# Patient Record
Sex: MALE | Race: WHITE | NOT HISPANIC OR LATINO | Employment: FULL TIME | URBAN - METROPOLITAN AREA
[De-identification: names, ages, dates, MRNs, and addresses within clinical notes are randomized per-mention and may not be internally consistent; named-entity substitution may affect disease eponyms.]

---

## 2017-01-23 DIAGNOSIS — Z20.6 HIV EXPOSURE: ICD-10-CM

## 2017-01-23 RX ORDER — EMTRICITABINE AND TENOFOVIR DISOPROXIL FUMARATE 200; 300 MG/1; MG/1
1 TABLET, FILM COATED ORAL DAILY
Qty: 30 TABLET | Refills: 3 | Status: SHIPPED | OUTPATIENT
Start: 2017-01-23 | End: 2017-05-29 | Stop reason: SDUPTHER

## 2017-05-25 DIAGNOSIS — Z20.6 HIV EXPOSURE: ICD-10-CM

## 2017-05-25 RX ORDER — EMTRICITABINE AND TENOFOVIR DISOPROXIL FUMARATE 200; 300 MG/1; MG/1
1 TABLET, FILM COATED ORAL DAILY
Qty: 30 TABLET | Refills: 3 | Status: CANCELLED | OUTPATIENT
Start: 2017-05-25 | End: 2017-06-24

## 2017-05-25 NOTE — TELEPHONE ENCOUNTER
Received refill request from this patient.  He needs labs and an appointment before I am able to refill his truvada.  Schedule HIV test and CMP.  He should have these checked every 3 months while taking truvada for prep.

## 2017-05-29 RX ORDER — EMTRICITABINE AND TENOFOVIR DISOPROXIL FUMARATE 200; 300 MG/1; MG/1
1 TABLET, FILM COATED ORAL DAILY
Qty: 30 TABLET | Refills: 3 | Status: SHIPPED | OUTPATIENT
Start: 2017-05-29 | End: 2017-11-09 | Stop reason: SDUPTHER

## 2017-06-05 ENCOUNTER — LAB VISIT (OUTPATIENT)
Dept: LAB | Facility: HOSPITAL | Age: 37
End: 2017-06-05
Attending: INTERNAL MEDICINE
Payer: COMMERCIAL

## 2017-06-05 DIAGNOSIS — Z20.6 HIV EXPOSURE: ICD-10-CM

## 2017-06-05 LAB
ALBUMIN SERPL BCP-MCNC: 4.1 G/DL
ALP SERPL-CCNC: 79 U/L
ALT SERPL W/O P-5'-P-CCNC: 37 U/L
ANION GAP SERPL CALC-SCNC: 11 MMOL/L
AST SERPL-CCNC: 37 U/L
BILIRUB SERPL-MCNC: 0.6 MG/DL
BUN SERPL-MCNC: 16 MG/DL
CALCIUM SERPL-MCNC: 9.7 MG/DL
CHLORIDE SERPL-SCNC: 103 MMOL/L
CO2 SERPL-SCNC: 27 MMOL/L
CREAT SERPL-MCNC: 1.3 MG/DL
EST. GFR  (AFRICAN AMERICAN): >60 ML/MIN/1.73 M^2
EST. GFR  (NON AFRICAN AMERICAN): >60 ML/MIN/1.73 M^2
GLUCOSE SERPL-MCNC: 105 MG/DL
POTASSIUM SERPL-SCNC: 4 MMOL/L
PROT SERPL-MCNC: 7.5 G/DL
SODIUM SERPL-SCNC: 141 MMOL/L

## 2017-06-05 PROCEDURE — 80053 COMPREHEN METABOLIC PANEL: CPT

## 2017-06-05 PROCEDURE — 86703 HIV-1/HIV-2 1 RESULT ANTBDY: CPT

## 2017-06-05 PROCEDURE — 36415 COLL VENOUS BLD VENIPUNCTURE: CPT

## 2017-06-06 LAB — HIV 1+2 AB+HIV1 P24 AG SERPL QL IA: NEGATIVE

## 2017-06-22 ENCOUNTER — OFFICE VISIT (OUTPATIENT)
Dept: INFECTIOUS DISEASES | Facility: CLINIC | Age: 37
End: 2017-06-22
Payer: COMMERCIAL

## 2017-06-22 VITALS
TEMPERATURE: 98 F | HEART RATE: 79 BPM | SYSTOLIC BLOOD PRESSURE: 125 MMHG | WEIGHT: 186 LBS | DIASTOLIC BLOOD PRESSURE: 73 MMHG

## 2017-06-22 DIAGNOSIS — Z20.6 HIV EXPOSURE: Primary | ICD-10-CM

## 2017-06-22 DIAGNOSIS — J06.9 VIRAL UPPER RESPIRATORY TRACT INFECTION: ICD-10-CM

## 2017-06-22 PROCEDURE — 99213 OFFICE O/P EST LOW 20 MIN: CPT | Mod: 25,S$GLB,, | Performed by: INTERNAL MEDICINE

## 2017-06-22 PROCEDURE — 90471 IMMUNIZATION ADMIN: CPT | Mod: S$GLB,,, | Performed by: INTERNAL MEDICINE

## 2017-06-22 PROCEDURE — 99999 PR PBB SHADOW E&M-EST. PATIENT-LVL III: CPT | Mod: PBBFAC,,, | Performed by: INTERNAL MEDICINE

## 2017-06-22 PROCEDURE — 90746 HEPB VACCINE 3 DOSE ADULT IM: CPT | Mod: S$GLB,,, | Performed by: INTERNAL MEDICINE

## 2017-06-22 PROCEDURE — 90651 9VHPV VACCINE 2/3 DOSE IM: CPT | Mod: S$GLB,,, | Performed by: INTERNAL MEDICINE

## 2017-06-22 PROCEDURE — 90472 IMMUNIZATION ADMIN EACH ADD: CPT | Mod: S$GLB,,, | Performed by: INTERNAL MEDICINE

## 2017-06-22 RX ORDER — CEFDINIR 300 MG/1
CAPSULE ORAL
COMMUNITY
Start: 2017-06-19 | End: 2017-12-15

## 2017-06-22 RX ORDER — CODEINE PHOSPHATE AND GUAIFENESIN 10; 100 MG/5ML; MG/5ML
SOLUTION ORAL
COMMUNITY
Start: 2017-06-19 | End: 2017-12-20

## 2017-06-22 RX ORDER — BENZONATATE 100 MG/1
100 CAPSULE ORAL 3 TIMES DAILY PRN
Qty: 30 CAPSULE | Refills: 1 | Status: SHIPPED | OUTPATIENT
Start: 2017-06-22 | End: 2017-12-20

## 2017-06-22 NOTE — PROGRESS NOTES
Subjective:      Patient ID: Jose Santiago is a 36 y.o. male.    Chief Complaint:Follow-up      History of Present Illness    35 y/o with no significant past medical history.  His an HIV negative male who is currently on PREP.  He is involved with an HIV+ partner who is stable on antiretrovirals.  He admits that they don't use condoms.  They recently got engaged and had an engagement party.  About 1-2 days after the engagement party (3 days ago), he developed cough, sinus congestion.  He had fevers and chills initially but this has quickly resolved.  Was seen in an urgent care facility.  Tests for influenza and strep were negative.  He was given cefdinir (omnicef) and a guaifenesin-codeine.  He is still coughing and is still congested.    He quit smoking about 2 months ago.  He got engaged recently and at his engagement party, he smoked again.  He became sick shortly afterwards.     Social History  He has been with his partner (ariel) for about 4 years.  His partner is HIV+    Review of Systems   Constitution: Positive for fever, weakness and malaise/fatigue. Negative for chills, decreased appetite, night sweats, weight gain and weight loss.   HENT: Negative for congestion, ear pain, headaches, hearing loss, hoarse voice, sore throat and tinnitus.    Eyes: Negative for blurred vision, redness and visual disturbance.   Cardiovascular: Negative for chest pain, leg swelling and palpitations.   Respiratory: Positive for cough. Negative for hemoptysis, shortness of breath and sputum production.    Hematologic/Lymphatic: Negative for adenopathy. Does not bruise/bleed easily.   Skin: Negative for dry skin, itching, rash and suspicious lesions.   Musculoskeletal: Negative for back pain, joint pain, myalgias and neck pain.   Gastrointestinal: Negative for abdominal pain, constipation, diarrhea, heartburn, nausea and vomiting.   Genitourinary: Negative for dysuria, flank pain, frequency, hematuria, hesitancy and urgency.    Neurological: Negative for dizziness, numbness and paresthesias.   Psychiatric/Behavioral: Negative for depression and memory loss. The patient does not have insomnia and is not nervous/anxious.      Objective:   Physical Exam   Constitutional: He is oriented to person, place, and time. He appears well-developed and well-nourished. No distress.   HENT:   Head: Normocephalic and atraumatic.   Right Ear: External ear normal.   Left Ear: External ear normal.   Nose: Nose normal.   Mouth/Throat: Oropharynx is clear and moist. No oropharyngeal exudate.   Eyes: Conjunctivae and EOM are normal. Pupils are equal, round, and reactive to light. Right eye exhibits no discharge. Left eye exhibits no discharge. No scleral icterus.   Neck: Normal range of motion. Neck supple. No JVD present. No tracheal deviation present. No thyromegaly present.   Cardiovascular: Normal rate, regular rhythm and intact distal pulses.  Exam reveals no gallop and no friction rub.    No murmur heard.  Pulmonary/Chest: Effort normal and breath sounds normal. No stridor. No respiratory distress. He has no wheezes. He has no rales. He exhibits no tenderness.   Abdominal: Soft. Bowel sounds are normal. He exhibits no distension and no mass. There is no tenderness. There is no rebound and no guarding.   Musculoskeletal: Normal range of motion. He exhibits no edema or tenderness.   Lymphadenopathy:     He has no cervical adenopathy.   Neurological: He is alert and oriented to person, place, and time. He has normal reflexes. He displays normal reflexes. No cranial nerve deficit. He exhibits normal muscle tone. Coordination normal.   Skin: Skin is warm. No rash noted. He is not diaphoretic. No erythema. No pallor.   Psychiatric: He has a normal mood and affect. His behavior is normal. Judgment and thought content normal.   Nursing note and vitals reviewed.    Assessment:       1. HIV exposure :  He is to continue truvada as part of PREP.  He will receive  his final HPV and hep b vaccination today.  I will check hepatitis b surface antibody in 3 months with his regular labs.   2. Viral upper respiratory tract infection :  Asked him to stop the antibiotics prescribed by urgent care.  Will treat symptomatically.           Plan:       HIV exposure  -     HIV-1 and HIV-2 antibodies; Future; Expected date: 06/22/2017  -     Comprehensive metabolic panel; Future; Expected date: 06/22/2017  -     CBC auto differential; Future; Expected date: 06/22/2017  -     RPR; Future; Expected date: 06/22/2017  -     C. trachomatis/N. gonorrhoeae by AMP DNA Urine; Future; Expected date: 06/22/2017  -     Urinalysis; Future; Expected date: 06/22/2017  -     HPV Vaccine (9-Valent) (3 Dose) (IM); Future; Expected date: 06/22/2017  -     Hepatitis B surface antibody; Future; Expected date: 06/22/2017  -     Hepatitis B Vaccine (Adult) (IM); Standing    Viral upper respiratory tract infection  -     benzonatate (TESSALON PERLES) 100 MG capsule; Take 1 capsule (100 mg total) by mouth 3 (three) times daily as needed for Cough.  Dispense: 30 capsule; Refill: 1

## 2017-08-01 ENCOUNTER — CLINICAL SUPPORT (OUTPATIENT)
Dept: INFECTIOUS DISEASES | Facility: CLINIC | Age: 37
End: 2017-08-01
Payer: COMMERCIAL

## 2017-08-01 DIAGNOSIS — Z20.6 HIV EXPOSURE: ICD-10-CM

## 2017-08-01 PROCEDURE — 90746 HEPB VACCINE 3 DOSE ADULT IM: CPT | Mod: S$GLB,,, | Performed by: INTERNAL MEDICINE

## 2017-08-01 PROCEDURE — 90471 IMMUNIZATION ADMIN: CPT | Mod: S$GLB,,, | Performed by: INTERNAL MEDICINE

## 2017-08-01 NOTE — PROGRESS NOTES
Hepatitis b vaccine administered.  Pt tolerated well and left in NAD.  Next appt fo vaccine # 3 to complete the series given.

## 2017-11-09 ENCOUNTER — TELEPHONE (OUTPATIENT)
Dept: INFECTIOUS DISEASES | Facility: CLINIC | Age: 37
End: 2017-11-09

## 2017-11-09 DIAGNOSIS — Z20.6 HIV EXPOSURE: Primary | ICD-10-CM

## 2017-11-09 DIAGNOSIS — Z20.6 HIV EXPOSURE: ICD-10-CM

## 2017-11-09 RX ORDER — EMTRICITABINE AND TENOFOVIR DISOPROXIL FUMARATE 200; 300 MG/1; MG/1
1 TABLET, FILM COATED ORAL DAILY
Qty: 30 TABLET | Refills: 0 | Status: SHIPPED | OUTPATIENT
Start: 2017-11-09 | End: 2017-11-20 | Stop reason: SDUPTHER

## 2017-11-09 RX ORDER — EMTRICITABINE AND TENOFOVIR DISOPROXIL FUMARATE 200; 300 MG/1; MG/1
1 TABLET, FILM COATED ORAL DAILY
Qty: 30 TABLET | Refills: 3 | Status: CANCELLED | OUTPATIENT
Start: 2017-11-09 | End: 2018-03-09

## 2017-11-09 NOTE — TELEPHONE ENCOUNTER
He needs to have labs and a follow up visit 1 week after.  Will refill his medications for 4 weeks for now.

## 2017-11-09 NOTE — TELEPHONE ENCOUNTER
Does this patient need to come in for labs and a follow-up visit? He was last seen in June. Please advise. XIMENA

## 2017-11-13 NOTE — TELEPHONE ENCOUNTER
Attempted to contact patient twice to schedule labs and follow-up visit, but I have not been successful in reaching him. XIMENA

## 2017-11-14 ENCOUNTER — PATIENT MESSAGE (OUTPATIENT)
Dept: INFECTIOUS DISEASES | Facility: CLINIC | Age: 37
End: 2017-11-14

## 2017-11-20 ENCOUNTER — PATIENT MESSAGE (OUTPATIENT)
Dept: INFECTIOUS DISEASES | Facility: CLINIC | Age: 37
End: 2017-11-20

## 2017-11-20 DIAGNOSIS — Z20.6 HIV EXPOSURE: ICD-10-CM

## 2017-11-21 ENCOUNTER — LAB VISIT (OUTPATIENT)
Dept: LAB | Facility: HOSPITAL | Age: 37
End: 2017-11-21
Attending: INTERNAL MEDICINE
Payer: COMMERCIAL

## 2017-11-21 DIAGNOSIS — Z20.6 HIV EXPOSURE: ICD-10-CM

## 2017-11-21 LAB
ALBUMIN SERPL BCP-MCNC: 4.3 G/DL
ALP SERPL-CCNC: 73 U/L
ALT SERPL W/O P-5'-P-CCNC: 24 U/L
ANION GAP SERPL CALC-SCNC: 8 MMOL/L
AST SERPL-CCNC: 25 U/L
BASOPHILS # BLD AUTO: 0.03 K/UL
BASOPHILS NFR BLD: 0.5 %
BILIRUB SERPL-MCNC: 0.5 MG/DL
BUN SERPL-MCNC: 16 MG/DL
CALCIUM SERPL-MCNC: 10.1 MG/DL
CHLORIDE SERPL-SCNC: 105 MMOL/L
CO2 SERPL-SCNC: 27 MMOL/L
CREAT SERPL-MCNC: 1.2 MG/DL
DIFFERENTIAL METHOD: ABNORMAL
EOSINOPHIL # BLD AUTO: 0 K/UL
EOSINOPHIL NFR BLD: 0.5 %
ERYTHROCYTE [DISTWIDTH] IN BLOOD BY AUTOMATED COUNT: 13 %
EST. GFR  (AFRICAN AMERICAN): >60 ML/MIN/1.73 M^2
EST. GFR  (NON AFRICAN AMERICAN): >60 ML/MIN/1.73 M^2
GLUCOSE SERPL-MCNC: 97 MG/DL
HCT VFR BLD AUTO: 42.1 %
HGB BLD-MCNC: 14.6 G/DL
HIV 1+2 AB+HIV1 P24 AG SERPL QL IA: NEGATIVE
IMM GRANULOCYTES # BLD AUTO: 0.01 K/UL
IMM GRANULOCYTES NFR BLD AUTO: 0.2 %
LYMPHOCYTES # BLD AUTO: 1.2 K/UL
LYMPHOCYTES NFR BLD: 19 %
MCH RBC QN AUTO: 31.2 PG
MCHC RBC AUTO-ENTMCNC: 34.7 G/DL
MCV RBC AUTO: 90 FL
MONOCYTES # BLD AUTO: 0.5 K/UL
MONOCYTES NFR BLD: 7.1 %
NEUTROPHILS # BLD AUTO: 4.6 K/UL
NEUTROPHILS NFR BLD: 72.7 %
NRBC BLD-RTO: 0 /100 WBC
PLATELET # BLD AUTO: 267 K/UL
PMV BLD AUTO: 9.4 FL
POTASSIUM SERPL-SCNC: 4.8 MMOL/L
PROT SERPL-MCNC: 8 G/DL
RBC # BLD AUTO: 4.68 M/UL
SODIUM SERPL-SCNC: 140 MMOL/L
WBC # BLD AUTO: 6.36 K/UL

## 2017-11-21 PROCEDURE — 86703 HIV-1/HIV-2 1 RESULT ANTBDY: CPT

## 2017-11-21 PROCEDURE — 80053 COMPREHEN METABOLIC PANEL: CPT

## 2017-11-21 PROCEDURE — 86592 SYPHILIS TEST NON-TREP QUAL: CPT

## 2017-11-21 PROCEDURE — 85025 COMPLETE CBC W/AUTO DIFF WBC: CPT

## 2017-11-21 PROCEDURE — 36415 COLL VENOUS BLD VENIPUNCTURE: CPT

## 2017-11-21 PROCEDURE — 86780 TREPONEMA PALLIDUM: CPT

## 2017-11-21 RX ORDER — EMTRICITABINE AND TENOFOVIR DISOPROXIL FUMARATE 200; 300 MG/1; MG/1
1 TABLET, FILM COATED ORAL DAILY
Qty: 30 TABLET | Refills: 0 | Status: SHIPPED | OUTPATIENT
Start: 2017-11-21 | End: 2017-12-15 | Stop reason: SDUPTHER

## 2017-11-22 LAB — RPR SER QL: NORMAL

## 2017-11-29 LAB — T PALLIDUM AB SER QL IF: REACTIVE

## 2017-12-15 ENCOUNTER — OFFICE VISIT (OUTPATIENT)
Dept: INFECTIOUS DISEASES | Facility: CLINIC | Age: 37
End: 2017-12-15
Payer: COMMERCIAL

## 2017-12-15 ENCOUNTER — CLINICAL SUPPORT (OUTPATIENT)
Dept: INFECTIOUS DISEASES | Facility: CLINIC | Age: 37
End: 2017-12-15
Payer: COMMERCIAL

## 2017-12-15 VITALS
SYSTOLIC BLOOD PRESSURE: 118 MMHG | TEMPERATURE: 99 F | BODY MASS INDEX: 31.28 KG/M2 | WEIGHT: 199.31 LBS | HEART RATE: 89 BPM | DIASTOLIC BLOOD PRESSURE: 72 MMHG | HEIGHT: 67 IN

## 2017-12-15 DIAGNOSIS — Z20.6 HIV EXPOSURE: ICD-10-CM

## 2017-12-15 PROCEDURE — 99213 OFFICE O/P EST LOW 20 MIN: CPT | Mod: S$GLB,,, | Performed by: INTERNAL MEDICINE

## 2017-12-15 PROCEDURE — 90471 IMMUNIZATION ADMIN: CPT | Mod: S$GLB,,, | Performed by: INTERNAL MEDICINE

## 2017-12-15 PROCEDURE — 99999 PR PBB SHADOW E&M-EST. PATIENT-LVL II: CPT | Mod: PBBFAC,,,

## 2017-12-15 PROCEDURE — 99999 PR PBB SHADOW E&M-EST. PATIENT-LVL III: CPT | Mod: PBBFAC,,, | Performed by: INTERNAL MEDICINE

## 2017-12-15 PROCEDURE — 90746 HEPB VACCINE 3 DOSE ADULT IM: CPT | Mod: S$GLB,,, | Performed by: INTERNAL MEDICINE

## 2017-12-15 RX ORDER — EMTRICITABINE AND TENOFOVIR DISOPROXIL FUMARATE 200; 300 MG/1; MG/1
1 TABLET, FILM COATED ORAL DAILY
Qty: 30 TABLET | Refills: 3 | Status: SHIPPED | OUTPATIENT
Start: 2017-12-15 | End: 2018-03-22 | Stop reason: SDUPTHER

## 2017-12-15 RX ORDER — AMOXICILLIN 875 MG/1
TABLET, FILM COATED ORAL
COMMUNITY
Start: 2017-11-26 | End: 2017-12-20

## 2017-12-15 NOTE — PROGRESS NOTES
Subjective:      Patient ID: Jose Santiago is a 36 y.o. male.    Chief Complaint:Follow-up    History of Present Illness       38 y/o with no significant past medical history.  His an HIV negative male who is currently on PREP.  He is involved with an HIV+ partner who is stable on antiretrovirals.  He admits that they don't use condoms.  they are now engaged and will be getting  late 2018.       Social History  He has been with his partner (ariel) for about 4 years.  His partner is HIV+ and takes HAART.    Review of Systems   Constitution: Negative for chills, decreased appetite, fever, weakness, malaise/fatigue, night sweats, weight gain and weight loss.   HENT: Negative for congestion, ear pain, hearing loss, hoarse voice, sore throat and tinnitus.    Eyes: Negative for blurred vision, redness and visual disturbance.   Cardiovascular: Negative for chest pain, leg swelling and palpitations.   Respiratory: Negative for cough, hemoptysis, shortness of breath and sputum production.    Hematologic/Lymphatic: Negative for adenopathy. Does not bruise/bleed easily.   Skin: Negative for dry skin, itching, rash and suspicious lesions.   Musculoskeletal: Negative for back pain, joint pain, myalgias and neck pain.   Gastrointestinal: Negative for abdominal pain, constipation, diarrhea, heartburn, nausea and vomiting.   Genitourinary: Negative for dysuria, flank pain, frequency, hematuria, hesitancy and urgency.   Neurological: Negative for dizziness, headaches, numbness and paresthesias.   Psychiatric/Behavioral: Negative for depression and memory loss. The patient does not have insomnia and is not nervous/anxious.      Objective:   Physical Exam   Constitutional: He is oriented to person, place, and time. He appears well-developed and well-nourished. No distress.   HENT:   Head: Normocephalic and atraumatic.   Right Ear: External ear normal.   Left Ear: External ear normal.   Nose: Nose normal.   Mouth/Throat:  Oropharynx is clear and moist. No oropharyngeal exudate.   Eyes: Conjunctivae and EOM are normal. Pupils are equal, round, and reactive to light. Right eye exhibits no discharge. Left eye exhibits no discharge. No scleral icterus.   Neck: Normal range of motion. Neck supple. No JVD present. No tracheal deviation present. No thyromegaly present.   Cardiovascular: Normal rate, regular rhythm, normal heart sounds and intact distal pulses.  Exam reveals no gallop and no friction rub.    No murmur heard.  Pulmonary/Chest: Effort normal and breath sounds normal. No stridor. No respiratory distress. He has no wheezes. He has no rales. He exhibits no tenderness.   Abdominal: Soft. Bowel sounds are normal. He exhibits no distension and no mass. There is no tenderness. There is no rebound and no guarding.   Musculoskeletal: Normal range of motion. He exhibits no edema or tenderness.   Lymphadenopathy:     He has no cervical adenopathy.   Neurological: He is alert and oriented to person, place, and time. He has normal reflexes. He displays normal reflexes. No cranial nerve deficit. He exhibits normal muscle tone. Coordination normal.   Skin: Skin is warm. No rash noted. He is not diaphoretic. No erythema. No pallor.   Psychiatric: He has a normal mood and affect. His behavior is normal. Judgment and thought content normal.   Nursing note and vitals reviewed.    Assessment:       1. HIV exposure        Labs reviewed.  HIV testing is negative.  Will continue truvada for PREP.  He will like to have a primary care physician.   Will try to arrange a primary care appointment.  He may continue PREP under the care of his PCP.  Plan:       HIV exposure  -     emtricitabine-tenofovir 200-300 mg (TRUVADA) 200-300 mg Tab; Take 1 tablet by mouth once daily.  Dispense: 30 tablet; Refill: 3

## 2017-12-20 ENCOUNTER — OFFICE VISIT (OUTPATIENT)
Dept: INTERNAL MEDICINE | Facility: CLINIC | Age: 37
End: 2017-12-20
Payer: COMMERCIAL

## 2017-12-20 VITALS
TEMPERATURE: 99 F | OXYGEN SATURATION: 98 % | BODY MASS INDEX: 30.93 KG/M2 | WEIGHT: 197.06 LBS | HEIGHT: 67 IN | SYSTOLIC BLOOD PRESSURE: 112 MMHG | HEART RATE: 83 BPM | DIASTOLIC BLOOD PRESSURE: 78 MMHG

## 2017-12-20 DIAGNOSIS — J32.9 RECURRENT SINUSITIS: ICD-10-CM

## 2017-12-20 DIAGNOSIS — R46.81 OBSESSIVE-COMPULSIVE BEHAVIOR: ICD-10-CM

## 2017-12-20 DIAGNOSIS — J01.91 ACUTE RECURRENT SINUSITIS, UNSPECIFIED LOCATION: Primary | ICD-10-CM

## 2017-12-20 DIAGNOSIS — Z51.81 MEDICATION MONITORING ENCOUNTER: ICD-10-CM

## 2017-12-20 DIAGNOSIS — Z13.220 ENCOUNTER FOR LIPID SCREENING FOR CARDIOVASCULAR DISEASE: ICD-10-CM

## 2017-12-20 DIAGNOSIS — Z13.6 ENCOUNTER FOR LIPID SCREENING FOR CARDIOVASCULAR DISEASE: ICD-10-CM

## 2017-12-20 DIAGNOSIS — F41.9 ANXIETY: ICD-10-CM

## 2017-12-20 DIAGNOSIS — R05.8 POST-VIRAL COUGH SYNDROME: ICD-10-CM

## 2017-12-20 PROBLEM — Z72.0 TOBACCO USE: Status: ACTIVE | Noted: 2017-12-20

## 2017-12-20 PROBLEM — Z20.6 CONTACT WITH AND (SUSPECTED) EXPOSURE TO HUMAN IMMUNODEFICIENCY VIRUS (HIV): Status: ACTIVE | Noted: 2017-12-20

## 2017-12-20 PROCEDURE — 94640 AIRWAY INHALATION TREATMENT: CPT | Mod: S$GLB,,, | Performed by: INTERNAL MEDICINE

## 2017-12-20 PROCEDURE — 99201 PR OFFICE/OUTPT VISIT,NEW,LEVL I: CPT | Mod: 25,S$GLB,, | Performed by: INTERNAL MEDICINE

## 2017-12-20 PROCEDURE — 99999 PR PBB SHADOW E&M-EST. PATIENT-LVL III: CPT | Mod: PBBFAC,,, | Performed by: INTERNAL MEDICINE

## 2017-12-20 RX ORDER — FLUTICASONE PROPIONATE 50 MCG
2 SPRAY, SUSPENSION (ML) NASAL 2 TIMES DAILY
Qty: 16 G | Refills: 12 | COMMUNITY
Start: 2017-12-20 | End: 2018-01-11

## 2017-12-20 RX ORDER — AMOXICILLIN AND CLAVULANATE POTASSIUM 875; 125 MG/1; MG/1
1 TABLET, FILM COATED ORAL 2 TIMES DAILY
Qty: 14 TABLET | Refills: 0 | Status: SHIPPED | OUTPATIENT
Start: 2017-12-20 | End: 2017-12-27

## 2017-12-20 RX ORDER — ALBUTEROL SULFATE 1.25 MG/3ML
1.25 SOLUTION RESPIRATORY (INHALATION)
Status: COMPLETED | OUTPATIENT
Start: 2017-12-20 | End: 2017-12-20

## 2017-12-20 RX ADMIN — ALBUTEROL SULFATE 1.25 MG: 1.25 SOLUTION RESPIRATORY (INHALATION) at 05:12

## 2017-12-20 NOTE — PROGRESS NOTES
Portions of this note are generated with voice recognition software. Typographical errors may exist.     SUBJECTIVE:    This is a/an 37 y.o. male here for primary care visit for  Chief Complaint   Patient presents with    Sinusitis    Otalgia    Cough     Symptoms today started about Sunday.  Started with rhinitis and postnasal drip and some sinus pressure with coughing.  Patient stating that he is very frustrated to have symptoms like this again.  He had similar symptoms at the end of November and was treated perhaps inappropriately with anti-biotic amoxicillin and a steroid injection.  Patient states that he suffers chronically with rhinosinusitis and for this reason he uses Flonase once daily almost every day.  Today he states that he doesn't have sinus pressure to a significant degree.  Nasal discharge is not purulent.  Denies uncharacteristic dyspnea on exertion or wheezing.  No constitutional symptoms    Obsessive compulsive behavior. states that he has been in therapy for about a year and a half to help with anxiety and obsessive and compulsive behavior.  States that recently symptoms have been getting worse and his counselor has indicated that he should pursue pharmacotherapy in addition to supportive psychotherapy.          Medications Reviewed and Updated    Past medical, family, and social histories were reviewed and updated.    Review of Systems negative unless otherwise noted in history of present illness-  ROS    General ROS: negative  Psychological ROS: negative  ENT ROS: negative  Allergy and Immunology ROS: negative  Genito-Urinary ROS: negative  Musculoskeletal ROS: negative      Allergic:  Review of patient's allergies indicates:  No Known Allergies    OBJECTIVE:  BP: 112/78 Pulse: 83 Temp: 98.5 °F (36.9 °C)  Wt Readings from Last 3 Encounters:   12/20/17 89.4 kg (197 lb 1.5 oz)   12/15/17 90.4 kg (199 lb 4.7 oz)   06/22/17 84.4 kg (186 lb)    Body mass index is 30.87 kg/m².  Previous Blood  Pressure Readings :   BP Readings from Last 3 Encounters:   12/20/17 112/78   12/15/17 118/72   06/22/17 125/73       Physical Exam    GEN: No apparent distress  HEENT: sclera non-icteric, conjunctiva clear  CV: no peripheral edema  PULM: breathing non-labored  ABD: Obese, protuberant abdomen.  PSYCH: appropriate affect  MSK: able to rise from chair without assistance  SKIN: normal skin turgor    Pertinent Labs Reviewed       ASSESSMENT/PLAN:    Acute recurrent sinusitis, unspecified location.Condition not optimally controlled. Detailed counseling on self care measures. Plan to monitor clinically in addition to plan below.   -     fluticasone (FLONASE) 50 mcg/actuation nasal spray; 2 sprays by Each Nare route 2 (two) times daily.  Dispense: 16 g; Refill: 12  -     amoxicillin-clavulanate 875-125mg (AUGMENTIN) 875-125 mg per tablet; Take 1 tablet by mouth 2 (two) times daily.  Dispense: 14 tablet; Refill: 0    Encounter for lipid screening for cardiovascular disease  -     Lipid panel; Future; Expected date: 12/20/2017    Post-viral cough syndrome  -     albuterol nebulizer solution 1.25 mg; Take 3 mLs (1.25 mg total) by nebulization one time.    Medication monitoring encounter  -     URINALYSIS; Future; Expected date: 12/20/2017  -     Comprehensive metabolic panel; Future; Expected date: 12/20/2017    Recurrent sinusitis  -     Comprehensive metabolic panel; Future; Expected date: 12/20/2017    Anxiety  -     TSH; Future; Expected date: 12/20/2017    Obsessive-compulsive behavior.Further evaluation warranted.  Recommendations as below.  - follow in clinic      Future Appointments  Date Time Provider Department Center   12/27/2017 7:30 AM LAB, [facility] [place]   12/27/2017 7:45 AM LAB, [facility] [place]   1/11/2018 2:00 PM Tani Giron MD [facility] [place]       Tani Giorn  12/20/2017  5:10 PM

## 2017-12-20 NOTE — PATIENT INSTRUCTIONS
"    Recommendations for today    - flonase is not a very good "as needed medicine" for nasal congestion  - Given your current symptoms we recommend using medication twice daily for up to 14 days in a row   - If symptoms fail to improve within the next 7-8 days start taking anti-biotic Augmentin   - If side effects develop from Flonase stop using Flonase and contact the clinic.  - Gargle after each use of Flonase and spit out the water to avoid sore throat caused by Flonase irritating the throat      Medicines you can take all month long for congestion  - for seasonal allergies and congestion, use loratidine (generic for Claritin), fexofenadine, cetirizine.   - take this throughout the year when your symptoms are coming back   - safe to take all week or month as needed  - take breaks when you think your triggers are going away    Decongestants   - do not use daily, this can make your congestion worse over time   - for rapid relief of allergy congestion you can try something like Claritin-D with a decongestant. Afrin. Psuedophed. Phenylerphrine   - this is for rapid relief and should only be used as needed, less than 4 days per week.     NASAL SALINE   Washing the nasal cavities with saline reduces postnasal drainage, removes secretions, and rinses away allergens and irritants.     Saline washes can be used immediately prior to administration of other intranasal medications so that the mucosa is freshly cleansed when the medications are introduced    You can use this daily, or multiple times daily, depending on the severity of symptoms        "

## 2017-12-27 ENCOUNTER — LAB VISIT (OUTPATIENT)
Dept: LAB | Facility: HOSPITAL | Age: 37
End: 2017-12-27
Attending: INTERNAL MEDICINE
Payer: COMMERCIAL

## 2017-12-27 DIAGNOSIS — Z51.81 MEDICATION MONITORING ENCOUNTER: ICD-10-CM

## 2017-12-27 DIAGNOSIS — Z13.220 ENCOUNTER FOR LIPID SCREENING FOR CARDIOVASCULAR DISEASE: ICD-10-CM

## 2017-12-27 DIAGNOSIS — F41.9 ANXIETY: ICD-10-CM

## 2017-12-27 DIAGNOSIS — Z13.6 ENCOUNTER FOR LIPID SCREENING FOR CARDIOVASCULAR DISEASE: ICD-10-CM

## 2017-12-27 DIAGNOSIS — J32.9 RECURRENT SINUSITIS: ICD-10-CM

## 2017-12-27 LAB
ALBUMIN SERPL BCP-MCNC: 4.1 G/DL
ALP SERPL-CCNC: 84 U/L
ALT SERPL W/O P-5'-P-CCNC: 55 U/L
ANION GAP SERPL CALC-SCNC: 8 MMOL/L
AST SERPL-CCNC: 30 U/L
BILIRUB SERPL-MCNC: 0.6 MG/DL
BUN SERPL-MCNC: 13 MG/DL
CALCIUM SERPL-MCNC: 9.8 MG/DL
CHLORIDE SERPL-SCNC: 104 MMOL/L
CHOLEST SERPL-MCNC: 198 MG/DL
CHOLEST/HDLC SERPL: 4.2 {RATIO}
CO2 SERPL-SCNC: 28 MMOL/L
CREAT SERPL-MCNC: 1.4 MG/DL
EST. GFR  (AFRICAN AMERICAN): >60 ML/MIN/1.73 M^2
EST. GFR  (NON AFRICAN AMERICAN): >60 ML/MIN/1.73 M^2
GLUCOSE SERPL-MCNC: 98 MG/DL
HDLC SERPL-MCNC: 47 MG/DL
HDLC SERPL: 23.7 %
LDLC SERPL CALC-MCNC: 118.2 MG/DL
NONHDLC SERPL-MCNC: 151 MG/DL
POTASSIUM SERPL-SCNC: 4.1 MMOL/L
PROT SERPL-MCNC: 7.7 G/DL
SODIUM SERPL-SCNC: 140 MMOL/L
TRIGL SERPL-MCNC: 164 MG/DL
TSH SERPL DL<=0.005 MIU/L-ACNC: 0.73 UIU/ML

## 2017-12-27 PROCEDURE — 80053 COMPREHEN METABOLIC PANEL: CPT

## 2017-12-27 PROCEDURE — 80061 LIPID PANEL: CPT

## 2017-12-27 PROCEDURE — 84443 ASSAY THYROID STIM HORMONE: CPT

## 2017-12-27 PROCEDURE — 36415 COLL VENOUS BLD VENIPUNCTURE: CPT | Mod: PO

## 2018-01-11 ENCOUNTER — OFFICE VISIT (OUTPATIENT)
Dept: INTERNAL MEDICINE | Facility: CLINIC | Age: 38
End: 2018-01-11
Payer: COMMERCIAL

## 2018-01-11 VITALS
TEMPERATURE: 98 F | HEIGHT: 67 IN | DIASTOLIC BLOOD PRESSURE: 80 MMHG | HEART RATE: 84 BPM | OXYGEN SATURATION: 97 % | SYSTOLIC BLOOD PRESSURE: 120 MMHG | BODY MASS INDEX: 31.31 KG/M2 | WEIGHT: 199.5 LBS

## 2018-01-11 DIAGNOSIS — Z20.6 CONTACT WITH AND (SUSPECTED) EXPOSURE TO HUMAN IMMUNODEFICIENCY VIRUS (HIV): ICD-10-CM

## 2018-01-11 DIAGNOSIS — Z11.3 ENCOUNTER FOR SCREENING EXAMINATION FOR SEXUALLY TRANSMITTED DISEASE: ICD-10-CM

## 2018-01-11 DIAGNOSIS — Z00.00 ANNUAL PHYSICAL EXAM: Primary | ICD-10-CM

## 2018-01-11 DIAGNOSIS — F41.9 ANXIETY: ICD-10-CM

## 2018-01-11 DIAGNOSIS — F32.A DEPRESSION, UNSPECIFIED DEPRESSION TYPE: ICD-10-CM

## 2018-01-11 DIAGNOSIS — R46.81 OBSESSIVE BEHAVIORS: ICD-10-CM

## 2018-01-11 DIAGNOSIS — A60.00 RECURRENT GENITAL HERPES: ICD-10-CM

## 2018-01-11 PROCEDURE — 99395 PREV VISIT EST AGE 18-39: CPT | Mod: S$GLB,,, | Performed by: INTERNAL MEDICINE

## 2018-01-11 PROCEDURE — 87591 N.GONORRHOEAE DNA AMP PROB: CPT | Mod: 91

## 2018-01-11 PROCEDURE — 99999 PR PBB SHADOW E&M-EST. PATIENT-LVL IV: CPT | Mod: PBBFAC,,, | Performed by: INTERNAL MEDICINE

## 2018-01-11 RX ORDER — CITALOPRAM 10 MG/1
10 TABLET ORAL DAILY
Qty: 30 TABLET | Refills: 3 | Status: SHIPPED | OUTPATIENT
Start: 2018-01-11 | End: 2018-03-22 | Stop reason: SDUPTHER

## 2018-01-11 RX ORDER — VALACYCLOVIR HYDROCHLORIDE 500 MG/1
500 TABLET, FILM COATED ORAL DAILY
Qty: 90 TABLET | Refills: 1 | Status: SHIPPED | OUTPATIENT
Start: 2018-01-11 | End: 2018-07-31 | Stop reason: SDUPTHER

## 2018-01-11 NOTE — PATIENT INSTRUCTIONS
AFTER CARE INSTRUCTIONS   · The name of your medicine is Celexa / Citalopram 10 mg (SSRI)    · You do not become addicted to this medicine.  However your body does become dependent on it after taking it more than 21 days.  Therefore do not stop taking it abruptly once you have been taking it more than 21 days.  If you need to address discontinuing the medicine call the office so we can give you special instructions.    · This medicine can cause you to either have more energy or make you sleepy. If it makes you sleepy take it at night. If it makes you have energy take it in the morning    · You will start on a low-dose of this medication.  This is to avoid any unwanted side effects or to let yourbody get use to mild side effects until they go away.     · Depression\anxiety symptoms do not typically get better until you have been on the medication for at least 3-4 weeks.  Therefore we will likely need to communicate with you after 3-4 weeks of therapy to determine if you need increased dosage of medication.    · This medicine may give you some stomach upset . Give this some time. If it is minor it should go away on it's own.     · If it does not go away or it bothers you, please call our office. Do NOT stop taking it all of a sudden. Stopping this medicine suddenly can cause your mood symptoms to come back in an unpleasant way.    · This is not generally a life threatening problem but if your mood symptoms worsen and you have persistent thoughts of hurting yourself or others, please call [phone] or [phone]    Never let this medicine bottle . You don't want to risk losing your supply of medicine.

## 2018-01-12 NOTE — PROGRESS NOTES
SUBJECTIVE:    This is a/an 37 y.o. male here for primary care visit for  Chief Complaint   Patient presents with    Follow-up     Patient overwhelmed regarding compulsive behaviors and anxiety and depression.  States that he continues to follow with a licensed social worker with whom he has a significant positive working relationship.  However he states that he is starting to reach some limits in the benefit and he is getting out of counseling sessions specifically regarding compulsive behavior anxiety and depression.  He denies severe symptoms such as suicidal ideation today.  Positive psychosocial factors include supportive fiancé.  Patient has never been on pharmacotherapy for these issues.  Has been reluctant to pursue pharmacotherapy but is willing to consider it given the persistence and disabling nature of his obsessive behavior anxiety and depressive episodes.  States that he has episodes of expansive mood and then episodes of depression.  Has never been diagnosed with bipolar depression.      General Sexual Risk Evaluation  · Patient Clinic Questionnaire results reviewed Yes    FOLLOW UP EVALUATION  - Symptoms of acute HIV disease past 4 weeks: no   - PrEP side effects: no   - Significant adherence lapses: no   - Started any new over the counter medicines or dietary supplements: no     STD History  Since last visit have you experienced   · Genital soreness, ulcers/wounds, or painful urination?  No  · Rash on the palms or soles? No  · Treated for STI?  No    Sexual Partner Evaluation  · Discussed details of HIV/STI risk related to sexual partner characteristics Yes.  in a monogamous relationship with HIV positive partner successfully treated on an anti-retroviral therapy    Condom Use  · Discussed details relating to condom use and attitudes  Yes.  Condom use may not be consistent.      Substance History  · Discussed relevant details relating to substance use that might increase risk for HIV acquisition  Deferred.  Patient admits that in the past he did have substance abuse problems including alcohol and other substances.      Medications Reviewed and Updated    Past medical, family, and social histories were reviewed and updated.    Review of Systems negative unless noted otherwise in history of present illness-  Review of Systems   HENT: Negative for hearing loss.    Eyes: Negative for discharge.   Respiratory: Negative for wheezing.    Cardiovascular: Negative for chest pain and palpitations.   Gastrointestinal: Negative for blood in stool, constipation, diarrhea and vomiting.   Genitourinary: Negative for hematuria and urgency.   Musculoskeletal: Negative for neck pain.   Neurological: Negative for weakness and headaches.   Endo/Heme/Allergies: Negative for polydipsia.       Allergic:  Review of patient's allergies indicates:  No Known Allergies    OBJECTIVE:  BP: 120/80 Pulse: 84 Temp: 98.4 °F (36.9 °C)  Wt Readings from Last 3 Encounters:   01/11/18 90.5 kg (199 lb 8.3 oz)   12/20/17 89.4 kg (197 lb 1.5 oz)   12/15/17 90.4 kg (199 lb 4.7 oz)    Body mass index is 31.25 kg/m².  Previous Blood Pressure Readings :   BP Readings from Last 3 Encounters:   01/11/18 120/80   12/20/17 112/78   12/15/17 118/72       Physical Exam    GEN: No apparent distress  HEENT: oropharynx clear, no cervical lymphadenopathy    CV: no peripheral edema  PULM: breathing non-labored  NEURO: cn ii-xii grossly intact, normal gait   SKIN: no visible rashes on the palms or exposed extremities  : Deferred      Pertinent Labs Reviewed       ASSESSMENT/PLAN:    Annual physical exam    Recurrent genital herpes.Condition stable.  Counseling on self-care measures. Plan to monitor clinically. Continue current medical plan.   -     valACYclovir (VALTREX) 500 MG tablet; Take 1 tablet (500 mg total) by mouth once daily.  Dispense: 90 tablet; Refill: 1    Contact with and (suspected) exposure to human immunodeficiency virus (hiv)  -     HIV-1 and  HIV-2 antibodies; Standing    Encounter for screening examination for sexually transmitted disease  -     Misc. C trach/N gonor Amplified RNA Rectal  -     Misc. C trach/N gonor Amplified RNA Throat    Anxiety  -     citalopram (CELEXA) 10 MG tablet; Take 1 tablet (10 mg total) by mouth once daily.  Dispense: 30 tablet; Refill: 3    Depression, unspecified depression type  -     citalopram (CELEXA) 10 MG tablet; Take 1 tablet (10 mg total) by mouth once daily.  Dispense: 30 tablet; Refill: 3    Obsessive behaviors  -     citalopram (CELEXA) 10 MG tablet; Take 1 tablet (10 mg total) by mouth once daily.  Dispense: 30 tablet; Refill: 3        ASSESSMENT  - HIV PrEP compliance reviewed today and deemed to be Adequate  - reviewed patient home medications for new interactions with Truvada PreP  yes    PLAN  - temporary suspension of HIV PreP indicated  no  - intensive compliance counseling indicated  no  - repeat HIV testing indicated (routine q1-3 month) yes   - repeat renal lab indicated no   - repeat HCV Ab testing indicated no   - repeat Hep B serology testing indicated no     STI Management Plan  - patient was screened for STI signs and symptoms today  - appropriate action will be taken based on today's findings.   PLAN   - patient counseled on safer sex methods today  - recommended vaccinations completed: vaccinations completed    - HAV vax complete   yes  - HBV series complete  yes  - HPV series complete  yes      Future Appointments  Date Time Provider Department Center   2/15/2018 1:40 PM MD [facility]   3/2/2018 7:00 AM [facility]  1/12/2018  5:09 PM

## 2018-01-13 LAB
C.TRACH RNA SOURCE: NORMAL
C.TRACH RNA SOURCE: NORMAL
C.TRACH,MISC. AMP RNA: NEGATIVE
C.TRACH,MISC. AMP RNA: NEGATIVE
N.GONORROHEAE, AMP RNA SOURCE: NORMAL
N.GONORROHEAE, AMP RNA SOURCE: NORMAL
N.GONORROHEAE, MISC. AMP RNA: NEGATIVE
N.GONORROHEAE, MISC. AMP RNA: NEGATIVE

## 2018-03-07 ENCOUNTER — LAB VISIT (OUTPATIENT)
Dept: LAB | Facility: HOSPITAL | Age: 38
End: 2018-03-07
Attending: INTERNAL MEDICINE
Payer: COMMERCIAL

## 2018-03-07 DIAGNOSIS — Z20.6 CONTACT WITH AND (SUSPECTED) EXPOSURE TO HUMAN IMMUNODEFICIENCY VIRUS (HIV): ICD-10-CM

## 2018-03-07 PROCEDURE — 86703 HIV-1/HIV-2 1 RESULT ANTBDY: CPT

## 2018-03-07 PROCEDURE — 36415 COLL VENOUS BLD VENIPUNCTURE: CPT | Mod: PO

## 2018-03-08 ENCOUNTER — PATIENT MESSAGE (OUTPATIENT)
Dept: INTERNAL MEDICINE | Facility: CLINIC | Age: 38
End: 2018-03-08

## 2018-03-08 DIAGNOSIS — Z20.6 CONTACT WITH AND (SUSPECTED) EXPOSURE TO HUMAN IMMUNODEFICIENCY VIRUS (HIV): Primary | ICD-10-CM

## 2018-03-08 LAB — HIV 1+2 AB+HIV1 P24 AG SERPL QL IA: NEGATIVE

## 2018-03-12 ENCOUNTER — PATIENT MESSAGE (OUTPATIENT)
Dept: INTERNAL MEDICINE | Facility: CLINIC | Age: 38
End: 2018-03-12

## 2018-03-22 ENCOUNTER — OFFICE VISIT (OUTPATIENT)
Dept: INTERNAL MEDICINE | Facility: CLINIC | Age: 38
End: 2018-03-22
Payer: COMMERCIAL

## 2018-03-22 VITALS
SYSTOLIC BLOOD PRESSURE: 136 MMHG | OXYGEN SATURATION: 96 % | HEIGHT: 67 IN | BODY MASS INDEX: 30.56 KG/M2 | WEIGHT: 194.69 LBS | HEART RATE: 92 BPM | DIASTOLIC BLOOD PRESSURE: 80 MMHG

## 2018-03-22 DIAGNOSIS — Z20.6 CONTACT WITH AND (SUSPECTED) EXPOSURE TO HUMAN IMMUNODEFICIENCY VIRUS (HIV): Primary | ICD-10-CM

## 2018-03-22 DIAGNOSIS — R46.81 OBSESSIVE-COMPULSIVE BEHAVIOR: ICD-10-CM

## 2018-03-22 DIAGNOSIS — F41.9 ANXIETY: ICD-10-CM

## 2018-03-22 DIAGNOSIS — Z51.81 MEDICATION MONITORING ENCOUNTER: ICD-10-CM

## 2018-03-22 PROCEDURE — 99999 PR PBB SHADOW E&M-EST. PATIENT-LVL III: CPT | Mod: PBBFAC,,, | Performed by: INTERNAL MEDICINE

## 2018-03-22 PROCEDURE — 99214 OFFICE O/P EST MOD 30 MIN: CPT | Mod: S$GLB,,, | Performed by: INTERNAL MEDICINE

## 2018-03-22 RX ORDER — CITALOPRAM 20 MG/1
20 TABLET, FILM COATED ORAL DAILY
Qty: 90 TABLET | Refills: 1 | Status: SHIPPED | OUTPATIENT
Start: 2018-03-22 | End: 2018-09-05 | Stop reason: SDUPTHER

## 2018-03-22 RX ORDER — EMTRICITABINE AND TENOFOVIR DISOPROXIL FUMARATE 200; 300 MG/1; MG/1
1 TABLET, FILM COATED ORAL DAILY
Qty: 30 TABLET | Refills: 3 | Status: SHIPPED | OUTPATIENT
Start: 2018-03-22 | End: 2018-06-21 | Stop reason: SDUPTHER

## 2018-03-22 NOTE — PROGRESS NOTES
SUBJECTIVE:    This is a/an 37 y.o. male here for primary care visit for  Chief Complaint   Patient presents with    Perp     follow up      Patient states that he has significant trouble with anxiety and irritability.  Citalopram 10 mg did not help noticeably with these symptoms.  Psychosocial stressors have improved recently with the conclusion of house renovations.  On further review of over-the-counter supplements the patient indicates that he has chronically been taking a vitamin pack/supplement patch with limited understanding of the side effect profile of the supplement.  On further review of the supplement is apparent that multiple ingredients are herbal derivatives with caffeine.  In addition to wearing this patch daily the patient supplements with caffeinated beverages.  Patient also takes another supplement from the same company that may have additional caffeine.  Patient is willing to reduce caffeine supplementation first to see the impact on mood before trying to determine if increased strength of citalopram is necessary.  Patient would also like to establish with a psychiatrist for further evaluation of the diagnostic possibility of bipolar 2 diagnosis which has never been formally evaluated.    General Sexual Risk Evaluation  · Patient Clinic Questionnaire results reviewed Yes    FOLLOW UP EVALUATION  - Symptoms of acute HIV disease past 4 weeks: no   - PrEP side effects: no   - Significant adherence lapses: no   - Started any new over the counter medicines or dietary supplements: no     STD History  Since last visit have you experienced   · Genital soreness, ulcers/wounds, or painful urination?  No  · Rash on the palms or soles? No  · Treated for STI?  No    Sexual Partner Evaluation  · Discussed details of HIV/STI risk related to sexual partner characteristics Yes    Condom Use  · Discussed details relating to condom use and attitudes  Yes      Substance History  · Discussed relevant details  relating to substance use that might increase risk for HIV acquisition Deferred      Medications Reviewed and Updated    Past medical, family, and social histories were reviewed and updated.    Review of Systems negative unless noted otherwise in history of present illness-  ROS    Allergic:  Review of patient's allergies indicates:  No Known Allergies    OBJECTIVE:  BP: 136/80 Pulse: 92    Wt Readings from Last 3 Encounters:   03/22/18 88.3 kg (194 lb 10.7 oz)   01/11/18 90.5 kg (199 lb 8.3 oz)   12/20/17 89.4 kg (197 lb 1.5 oz)    Body mass index is 30.49 kg/m².  Previous Blood Pressure Readings :   BP Readings from Last 3 Encounters:   03/22/18 136/80   01/11/18 120/80   12/20/17 112/78       Physical Exam    GEN: No apparent distress  HEENT: oropharynx clear, no cervical lymphadenopathy    CV: no peripheral edema  PULM: breathing non-labored  NEURO: cn ii-xii grossly intact, normal gait   SKIN: no visible rashes on the palms or exposed extremities  : Deferred      Pertinent Labs Reviewed       ASSESSMENT/PLAN:    ASSESSMENT  - HIV PrEP compliance reviewed today and deemed to be Adequate  - reviewed patient home medications for new interactions with Truvada PreP  yes    PLAN  - temporary suspension of HIV PreP indicated  no  - intensive compliance counseling indicated  no  - repeat HIV testing indicated (routine q1-3 month) yes   - repeat renal lab indicated yes   - repeat HCV Ab testing indicated no   - repeat Hep B serology testing indicated no     STI Management Plan  - patient was screened for STI signs and symptoms today  - appropriate action will be taken based on today's findings.   PLAN   - patient counseled on safer sex methods today  - recommended vaccinations completed: Hep A booster likely needed    - HAV vax complete   no  - HBV series complete  yes  - HPV series complete  yes    .HIV Pre-Exposure Prohylaxis (PrEP)  -     emtricitabine-tenofovir 200-300 mg (TRUVADA) 200-300 mg Tab; Take 1 tablet by  mouth once daily.  Dispense: 30 tablet; Refill: 3  -     HIV-1 and HIV-2 antibodies; Future; Expected date: 03/22/2018    Obsessive-compulsive behavior  -     citalopram (CELEXA) 20 MG tablet; Take 1 tablet (20 mg total) by mouth once daily.  Dispense: 90 tablet; Refill: 1    Anxiety  -     citalopram (CELEXA) 20 MG tablet; Take 1 tablet (20 mg total) by mouth once daily.  Dispense: 90 tablet; Refill: 1    Medication monitoring encounter  -     Renal function panel; Future; Expected date: 03/22/2018  -     URINALYSIS; Future; Expected date: 03/22/2018          Future Appointments  Date Time Provider Department Center   5/30/2018 11:00 AM lCint Linares III, NP [facility]   6/28/2018 3:00 PM Tani Giron MD [facility] Anushka Giron  3/22/2018  3:27 PM

## 2018-03-22 NOTE — PATIENT INSTRUCTIONS
Recommendations for today    Gradually start reducing caffeine consumption.  Reduce supplemental caffeine gradually to avoid withdrawal symptoms.  The goal should be no more than one standard caffeinated product in the morning and then 1 after lunch.  There should be no caffeine after 3 PM.    After making adjustments in caffeine proceed with increasing the dosage of citalopram.  Doing this in a sequential fashion can help you better understand the effect of reducing caffeine from the effect of increasing the citalopram.  If mood symptoms improve remarkably with reducing caffeine you may need to reconsider increasing the dosage of citalopram to 20 mg.  If you would like to reconsider increasing the dosage contact my office for additional recommendations.

## 2018-06-20 ENCOUNTER — LAB VISIT (OUTPATIENT)
Dept: LAB | Facility: HOSPITAL | Age: 38
End: 2018-06-20
Attending: INTERNAL MEDICINE
Payer: COMMERCIAL

## 2018-06-20 DIAGNOSIS — Z51.81 MEDICATION MONITORING ENCOUNTER: ICD-10-CM

## 2018-06-20 DIAGNOSIS — Z20.6 CONTACT WITH AND (SUSPECTED) EXPOSURE TO HUMAN IMMUNODEFICIENCY VIRUS (HIV): ICD-10-CM

## 2018-06-20 LAB
ALBUMIN SERPL BCP-MCNC: 4.2 G/DL
ANION GAP SERPL CALC-SCNC: 7 MMOL/L
BUN SERPL-MCNC: 16 MG/DL
CALCIUM SERPL-MCNC: 9.7 MG/DL
CHLORIDE SERPL-SCNC: 104 MMOL/L
CO2 SERPL-SCNC: 26 MMOL/L
CREAT SERPL-MCNC: 1.3 MG/DL
EST. GFR  (AFRICAN AMERICAN): >60 ML/MIN/1.73 M^2
EST. GFR  (NON AFRICAN AMERICAN): >60 ML/MIN/1.73 M^2
GLUCOSE SERPL-MCNC: 77 MG/DL
PHOSPHATE SERPL-MCNC: 2.9 MG/DL
POTASSIUM SERPL-SCNC: 3.9 MMOL/L
SODIUM SERPL-SCNC: 137 MMOL/L

## 2018-06-20 PROCEDURE — 86703 HIV-1/HIV-2 1 RESULT ANTBDY: CPT

## 2018-06-20 PROCEDURE — 36415 COLL VENOUS BLD VENIPUNCTURE: CPT | Mod: PO

## 2018-06-20 PROCEDURE — 80069 RENAL FUNCTION PANEL: CPT

## 2018-06-21 DIAGNOSIS — Z20.6 CONTACT WITH AND (SUSPECTED) EXPOSURE TO HUMAN IMMUNODEFICIENCY VIRUS (HIV): ICD-10-CM

## 2018-06-21 LAB — HIV 1+2 AB+HIV1 P24 AG SERPL QL IA: NEGATIVE

## 2018-06-21 RX ORDER — EMTRICITABINE AND TENOFOVIR DISOPROXIL FUMARATE 200; 300 MG/1; MG/1
1 TABLET, FILM COATED ORAL DAILY
Qty: 30 TABLET | Refills: 3 | Status: SHIPPED | OUTPATIENT
Start: 2018-06-21 | End: 2018-07-17 | Stop reason: SDUPTHER

## 2018-07-17 ENCOUNTER — OFFICE VISIT (OUTPATIENT)
Dept: INTERNAL MEDICINE | Facility: CLINIC | Age: 38
End: 2018-07-17
Payer: COMMERCIAL

## 2018-07-17 ENCOUNTER — LAB VISIT (OUTPATIENT)
Dept: LAB | Facility: HOSPITAL | Age: 38
End: 2018-07-17
Attending: INTERNAL MEDICINE
Payer: COMMERCIAL

## 2018-07-17 DIAGNOSIS — Z78.9 HEPATITIS B VACCINATION STATUS UNKNOWN: ICD-10-CM

## 2018-07-17 DIAGNOSIS — Z20.6 CONTACT WITH AND (SUSPECTED) EXPOSURE TO HUMAN IMMUNODEFICIENCY VIRUS (HIV): Primary | ICD-10-CM

## 2018-07-17 DIAGNOSIS — Z11.3 ENCOUNTER FOR SCREENING EXAMINATION FOR SEXUALLY TRANSMITTED DISEASE: ICD-10-CM

## 2018-07-17 DIAGNOSIS — F41.9 ANXIETY: ICD-10-CM

## 2018-07-17 PROCEDURE — 99999 PR PBB SHADOW E&M-EST. PATIENT-LVL II: CPT | Mod: PBBFAC,,, | Performed by: INTERNAL MEDICINE

## 2018-07-17 PROCEDURE — 86706 HEP B SURFACE ANTIBODY: CPT

## 2018-07-17 PROCEDURE — 36415 COLL VENOUS BLD VENIPUNCTURE: CPT | Mod: PO

## 2018-07-17 PROCEDURE — 99215 OFFICE O/P EST HI 40 MIN: CPT | Mod: S$GLB,,, | Performed by: INTERNAL MEDICINE

## 2018-07-17 PROCEDURE — 86592 SYPHILIS TEST NON-TREP QUAL: CPT

## 2018-07-17 RX ORDER — EMTRICITABINE AND TENOFOVIR DISOPROXIL FUMARATE 200; 300 MG/1; MG/1
1 TABLET, FILM COATED ORAL DAILY
Qty: 30 TABLET | Refills: 1 | Status: SHIPPED | OUTPATIENT
Start: 2018-07-17 | End: 2018-09-25 | Stop reason: SDUPTHER

## 2018-07-17 NOTE — PROGRESS NOTES
SUBJECTIVE:    This is a/an 37 y.o. male here for primary care visit for  Chief Complaint   Patient presents with    PrEP       General Sexual Risk Evaluation  · Patient Clinic Questionnaire results reviewed Yes    FOLLOW UP EVALUATION  - Symptoms of acute HIV disease past 4 weeks: no   - PrEP side effects: no   - Significant adherence lapses: no   - Started any new over the counter medicines or dietary supplements: no     STD History  Since last visit have you experienced   · Genital soreness, ulcers/wounds, or painful urination?  No  · Rash on the palms or soles? No  · Treated for STI?  No    Sexual Partner Evaluation  · Discussed details of HIV/STI risk related to sexual partner characteristics Yes   · Patient remains the same partner living with HIV.  Communication about mutually monogamous me is not excellent and the patient wants additional reassurance by staying on prep.  The patient also does not have excellent communication about the overall success of viral suppression in his partner.  For this reason he also wants to continue prep.    Condom Use  · Discussed details relating to condom use and attitudes  Yes   · Patient states that he is sexually active with his partner living with HIV.  When they do have sex they do so without condoms.        Substance History  · Discussed relevant details relating to substance use that might increase risk for HIV acquisition Deferred      The patient has not noticed any significant side effects since increasing the dosage of citalopram to 20 mg.  Overall the patient states that anxiety symptoms have improved.  He has going to couples counseling in the past but this has temporarily been on hold.  The patient plans to move to [facility].  He will be closer to family in overall he feels that this will be a more relaxing social situation.  Family is supportive.  He believes he will have access to therapist in [place].  He is interested to pursue self-directed  learning to help cope with anxiety.    The patient completed the following standardized mental health symptom questionnaire(s) GAD7. Results where reviewed and recommendations made to the patient based on these results. The questionnaire(s) are scanned into the EPIC media tab.           Medications Reviewed and Updated    Past medical, family, and social histories were reviewed and updated.    Review of Systems negative unless noted otherwise in history of present illness-  ROS    Allergic:  Review of patient's allergies indicates:  No Known Allergies    OBJECTIVE:         Wt Readings from Last 3 Encounters:   03/22/18 88.3 kg (194 lb 10.7 oz)   01/11/18 90.5 kg (199 lb 8.3 oz)   12/20/17 89.4 kg (197 lb 1.5 oz)    There is no height or weight on file to calculate BMI.  Previous Blood Pressure Readings :   BP Readings from Last 3 Encounters:   03/22/18 136/80   01/11/18 120/80   12/20/17 112/78       Physical Exam    GEN: No apparent distress  HEENT: oropharynx clear, no cervical lymphadenopathy    CV: no peripheral edema  PULM: breathing non-labored  NEURO: cn ii-xii grossly intact, normal gait   SKIN: no visible rashes on the palms or exposed extremities  : Deferred      Pertinent Labs Reviewed       ASSESSMENT/PLAN:    ASSESSMENT  - HIV PrEP compliance reviewed today and deemed to be Adequate  - reviewed patient home medications for new interactions with Truvada PreP  no    PLAN  - temporary suspension of HIV PreP indicated  no  - intensive compliance counseling indicated  no  - repeat HIV testing indicated (routine q1-3 month) yes   - repeat renal lab indicated yes   - repeat HCV Ab testing indicated no   - repeat Hep B serology testing indicated yes     STI Management Plan  - patient was screened for STI signs and symptoms today  - appropriate action will be taken based on today's findings.   PLAN   - patient counseled on safer sex methods today  - recommended vaccinations completed: vaccinations not  indicated    - HAV vax complete   no  - HBV series complete  yes  - HPV series complete  yes    .HIV Pre-Exposure Prohylaxis (PrEP)  -     HIV-1 and HIV-2 antibodies; Standing  -     Comprehensive metabolic panel; Standing  -     emtricitabine-tenofovir 200-300 mg (TRUVADA) 200-300 mg Tab; Take 1 tablet by mouth once daily.  Dispense: 30 tablet; Refill: 1    Hepatitis B vaccination status unknown  -     Hepatitis B surface antibody; Future; Expected date: 07/17/2018    Anxiety.Condition improving.  Counseling on self-care measures today.  Continue with current plan in addition to recommendations written on After Visit Summary.   -     Comprehensive metabolic panel; Standing    Encounter for screening examination for sexually transmitted disease  -     RPR; Standing  -     Cancel: Misc. C trach/N gonor Amplified RNA Throat; Standing  -     C. trachomatis/N. gonorrhoeae by AMP DNA Urine; Standing  -     Cancel: Misc. C trach/N gonor Amplified RNA Throat; Standing        Future Appointments  Date Time Provider Department Center   9/7/2018 7:45 AM [facility]  7/17/2018  6:25 PM

## 2018-07-18 ENCOUNTER — PATIENT MESSAGE (OUTPATIENT)
Dept: INTERNAL MEDICINE | Facility: CLINIC | Age: 38
End: 2018-07-18

## 2018-07-18 LAB
HBV SURFACE AB SER-ACNC: POSITIVE M[IU]/ML
RPR SER QL: NORMAL

## 2018-07-31 DIAGNOSIS — A60.00 RECURRENT GENITAL HERPES: ICD-10-CM

## 2018-08-01 RX ORDER — VALACYCLOVIR HYDROCHLORIDE 500 MG/1
500 TABLET, FILM COATED ORAL DAILY
Qty: 90 TABLET | Refills: 1 | Status: SHIPPED | OUTPATIENT
Start: 2018-08-01 | End: 2018-09-05 | Stop reason: SDUPTHER

## 2018-09-05 ENCOUNTER — PATIENT MESSAGE (OUTPATIENT)
Dept: INTERNAL MEDICINE | Facility: CLINIC | Age: 38
End: 2018-09-05

## 2018-09-05 DIAGNOSIS — F41.9 ANXIETY: ICD-10-CM

## 2018-09-05 DIAGNOSIS — A60.00 RECURRENT GENITAL HERPES: ICD-10-CM

## 2018-09-05 DIAGNOSIS — R46.81 OBSESSIVE-COMPULSIVE BEHAVIOR: ICD-10-CM

## 2018-09-05 RX ORDER — VALACYCLOVIR HYDROCHLORIDE 500 MG/1
500 TABLET, FILM COATED ORAL DAILY
Qty: 90 TABLET | Refills: 1 | Status: SHIPPED | OUTPATIENT
Start: 2018-09-05 | End: 2018-09-13 | Stop reason: SDUPTHER

## 2018-09-05 RX ORDER — CITALOPRAM 20 MG/1
20 TABLET, FILM COATED ORAL DAILY
Qty: 90 TABLET | Refills: 1 | Status: SHIPPED | OUTPATIENT
Start: 2018-09-05 | End: 2019-01-16 | Stop reason: SDUPTHER

## 2018-09-13 ENCOUNTER — LAB VISIT (OUTPATIENT)
Dept: LAB | Facility: HOSPITAL | Age: 38
End: 2018-09-13
Attending: INTERNAL MEDICINE
Payer: COMMERCIAL

## 2018-09-13 DIAGNOSIS — Z20.6 CONTACT WITH AND (SUSPECTED) EXPOSURE TO HUMAN IMMUNODEFICIENCY VIRUS (HIV): ICD-10-CM

## 2018-09-13 DIAGNOSIS — A60.00 RECURRENT GENITAL HERPES: ICD-10-CM

## 2018-09-13 PROCEDURE — 86703 HIV-1/HIV-2 1 RESULT ANTBDY: CPT

## 2018-09-13 PROCEDURE — 36415 COLL VENOUS BLD VENIPUNCTURE: CPT | Mod: PO

## 2018-09-14 LAB — HIV 1+2 AB+HIV1 P24 AG SERPL QL IA: NEGATIVE

## 2018-09-14 RX ORDER — VALACYCLOVIR HYDROCHLORIDE 500 MG/1
500 TABLET, FILM COATED ORAL DAILY
Qty: 90 TABLET | Refills: 1 | Status: SHIPPED | OUTPATIENT
Start: 2018-09-14 | End: 2019-01-16 | Stop reason: SDUPTHER

## 2018-09-25 DIAGNOSIS — Z20.6 CONTACT WITH AND (SUSPECTED) EXPOSURE TO HUMAN IMMUNODEFICIENCY VIRUS (HIV): ICD-10-CM

## 2018-09-25 RX ORDER — EMTRICITABINE AND TENOFOVIR DISOPROXIL FUMARATE 200; 300 MG/1; MG/1
1 TABLET, FILM COATED ORAL DAILY
Qty: 90 TABLET | Refills: 0 | Status: SHIPPED | OUTPATIENT
Start: 2018-09-25 | End: 2019-01-10 | Stop reason: SDUPTHER

## 2018-12-20 ENCOUNTER — PATIENT MESSAGE (OUTPATIENT)
Dept: FAMILY MEDICINE | Facility: CLINIC | Age: 38
End: 2018-12-20

## 2018-12-20 DIAGNOSIS — Z20.6 CONTACT WITH AND (SUSPECTED) EXPOSURE TO HUMAN IMMUNODEFICIENCY VIRUS (HIV): ICD-10-CM

## 2018-12-20 RX ORDER — EMTRICITABINE AND TENOFOVIR DISOPROXIL FUMARATE 200; 300 MG/1; MG/1
TABLET, FILM COATED ORAL
Qty: 30 TABLET | Refills: 0 | OUTPATIENT
Start: 2018-12-20

## 2019-01-09 ENCOUNTER — PATIENT MESSAGE (OUTPATIENT)
Dept: INTERNAL MEDICINE | Facility: CLINIC | Age: 39
End: 2019-01-09

## 2019-01-10 DIAGNOSIS — Z20.6 CONTACT WITH AND (SUSPECTED) EXPOSURE TO HUMAN IMMUNODEFICIENCY VIRUS (HIV): ICD-10-CM

## 2019-01-10 RX ORDER — EMTRICITABINE AND TENOFOVIR DISOPROXIL FUMARATE 200; 300 MG/1; MG/1
1 TABLET, FILM COATED ORAL DAILY
Qty: 30 TABLET | Refills: 0 | Status: SHIPPED | OUTPATIENT
Start: 2019-01-10 | End: 2019-01-31 | Stop reason: SDUPTHER

## 2019-01-16 DIAGNOSIS — R46.81 OBSESSIVE-COMPULSIVE BEHAVIOR: ICD-10-CM

## 2019-01-16 DIAGNOSIS — A60.00 RECURRENT GENITAL HERPES: ICD-10-CM

## 2019-01-16 DIAGNOSIS — F41.9 ANXIETY: ICD-10-CM

## 2019-01-17 RX ORDER — CITALOPRAM 20 MG/1
TABLET, FILM COATED ORAL
Qty: 90 TABLET | Refills: 1 | Status: SHIPPED | OUTPATIENT
Start: 2019-01-17 | End: 2019-06-24 | Stop reason: SDUPTHER

## 2019-01-17 RX ORDER — VALACYCLOVIR HYDROCHLORIDE 500 MG/1
TABLET, FILM COATED ORAL
Qty: 90 TABLET | Refills: 1 | Status: SHIPPED | OUTPATIENT
Start: 2019-01-17 | End: 2019-06-24 | Stop reason: SDUPTHER

## 2019-01-22 ENCOUNTER — PATIENT MESSAGE (OUTPATIENT)
Dept: FAMILY MEDICINE | Facility: CLINIC | Age: 39
End: 2019-01-22

## 2019-01-22 DIAGNOSIS — F41.9 ANXIETY: ICD-10-CM

## 2019-01-22 DIAGNOSIS — Z00.00 BLOOD TESTS FOR ROUTINE GENERAL PHYSICAL EXAMINATION: ICD-10-CM

## 2019-01-22 DIAGNOSIS — Z72.51 HIGH RISK SEXUAL BEHAVIOR, UNSPECIFIED TYPE: ICD-10-CM

## 2019-01-22 DIAGNOSIS — Z20.6 CONTACT WITH AND (SUSPECTED) EXPOSURE TO HUMAN IMMUNODEFICIENCY VIRUS (HIV): Primary | ICD-10-CM

## 2019-01-31 DIAGNOSIS — Z20.6 CONTACT WITH AND (SUSPECTED) EXPOSURE TO HUMAN IMMUNODEFICIENCY VIRUS (HIV): ICD-10-CM

## 2019-01-31 LAB
ALBUMIN SERPL-MCNC: 5.1 G/DL (ref 3.6–5.1)
ALBUMIN/GLOB SERPL: 1.8 (CALC) (ref 1–2.5)
ALP SERPL-CCNC: 92 U/L (ref 40–115)
ALT SERPL-CCNC: 32 U/L (ref 9–46)
AST SERPL-CCNC: 29 U/L (ref 10–40)
BILIRUB SERPL-MCNC: 0.5 MG/DL (ref 0.2–1.2)
BUN SERPL-MCNC: 17 MG/DL (ref 7–25)
BUN/CREAT SERPL: ABNORMAL (CALC) (ref 6–22)
CALCIUM SERPL-MCNC: 10 MG/DL (ref 8.6–10.3)
CHLORIDE SERPL-SCNC: 102 MMOL/L (ref 98–110)
CO2 SERPL-SCNC: 24 MMOL/L (ref 20–32)
CREAT SERPL-MCNC: 1.18 MG/DL (ref 0.6–1.35)
EST. GFR  (NON AFRICAN AMERICAN): 79 ML/MIN/1.73M2
GLOBULIN SER CALC-MCNC: 2.8 G/DL (CALC) (ref 1.9–3.7)
GLUCOSE SERPL-MCNC: 64 MG/DL (ref 65–99)
HIV 1+2 AB+HIV1 P24 AG SERPL QL IA: NORMAL
POTASSIUM SERPL-SCNC: 4.7 MMOL/L (ref 3.5–5.3)
PROT SERPL-MCNC: 7.9 G/DL (ref 6.1–8.1)
RPR SER QL: NORMAL
SODIUM SERPL-SCNC: 138 MMOL/L (ref 135–146)

## 2019-01-31 RX ORDER — EMTRICITABINE AND TENOFOVIR DISOPROXIL FUMARATE 200; 300 MG/1; MG/1
1 TABLET, FILM COATED ORAL DAILY
Qty: 90 TABLET | Refills: 0 | Status: SHIPPED | OUTPATIENT
Start: 2019-01-31 | End: 2019-05-29 | Stop reason: SDUPTHER

## 2019-01-31 RX ORDER — EMTRICITABINE AND TENOFOVIR DISOPROXIL FUMARATE 200; 300 MG/1; MG/1
TABLET, FILM COATED ORAL
Qty: 30 TABLET | Refills: 0 | Status: CANCELLED | OUTPATIENT
Start: 2019-01-31

## 2019-05-08 ENCOUNTER — PATIENT MESSAGE (OUTPATIENT)
Dept: FAMILY MEDICINE | Facility: CLINIC | Age: 39
End: 2019-05-08

## 2019-05-08 DIAGNOSIS — Z20.6 CONTACT WITH AND (SUSPECTED) EXPOSURE TO HUMAN IMMUNODEFICIENCY VIRUS (HIV): ICD-10-CM

## 2019-05-08 RX ORDER — EMTRICITABINE AND TENOFOVIR DISOPROXIL FUMARATE 200; 300 MG/1; MG/1
TABLET, FILM COATED ORAL
Qty: 30 TABLET | Refills: 0 | OUTPATIENT
Start: 2019-05-08

## 2019-05-14 ENCOUNTER — TELEPHONE (OUTPATIENT)
Dept: INTERNAL MEDICINE | Facility: CLINIC | Age: 39
End: 2019-05-14

## 2019-05-14 DIAGNOSIS — Z11.3 SCREENING FOR STD (SEXUALLY TRANSMITTED DISEASE): Primary | ICD-10-CM

## 2019-05-14 DIAGNOSIS — Z20.6 CONTACT WITH AND (SUSPECTED) EXPOSURE TO HUMAN IMMUNODEFICIENCY VIRUS (HIV): Primary | ICD-10-CM

## 2019-05-14 DIAGNOSIS — Z72.51 HIGH RISK SEXUAL BEHAVIOR, UNSPECIFIED TYPE: ICD-10-CM

## 2019-05-14 DIAGNOSIS — Z00.00 BLOOD TESTS FOR ROUTINE GENERAL PHYSICAL EXAMINATION: ICD-10-CM

## 2019-05-14 NOTE — TELEPHONE ENCOUNTER
----- Message from Tani Giron MD sent at 5/14/2019  8:32 AM CDT -----  Please contact patient about getting the following testing done at [facility].  I have copied the message regarding how we requested blood work the last time.  He is to get the following blood work done    HIV, RPR, CMP, Hep C    --------     I just called this location from the [facility] website to schedule my labs.. They said they don't have your order.     [facility]   [address] [place] [id number]   Tests at this location   Routine Lab    [phone]   [phone]     They asked that you please fax it over and I can go in.  Again.. Sorry for the confusion as this is proving to be quite challenging.     Jose

## 2019-05-14 NOTE — TELEPHONE ENCOUNTER
Poke with pt to inform that orders have been resent to [facility]. Pt states that he is in town and request to get labs at [place]. Pt also request to get additional labs. Pt was scheduled to get labs on 5/15/19. Pt voiced understanding.

## 2019-05-20 DIAGNOSIS — Z20.6 CONTACT WITH AND (SUSPECTED) EXPOSURE TO HUMAN IMMUNODEFICIENCY VIRUS (HIV): ICD-10-CM

## 2019-05-20 RX ORDER — EMTRICITABINE AND TENOFOVIR DISOPROXIL FUMARATE 200; 300 MG/1; MG/1
1 TABLET, FILM COATED ORAL DAILY
Qty: 90 TABLET | Refills: 0 | OUTPATIENT
Start: 2019-05-20 | End: 2019-08-18

## 2019-05-20 NOTE — TELEPHONE ENCOUNTER
Spoke with pt to inform that orders have been printed and will be faxed no laser than 5/21/19. Understanding voiced.

## 2019-05-20 NOTE — TELEPHONE ENCOUNTER
----- Message from Katerin Rodriguez sent at 5/20/2019 10:42 AM CDT -----  Contact: [phone]/self  Patient requesting to speak with you regarding orders for lab work.send to [facility] - fax [phone].

## 2019-05-21 ENCOUNTER — PATIENT MESSAGE (OUTPATIENT)
Dept: FAMILY MEDICINE | Facility: CLINIC | Age: 39
End: 2019-05-21

## 2019-05-21 DIAGNOSIS — Z20.6 CONTACT WITH AND (SUSPECTED) EXPOSURE TO HUMAN IMMUNODEFICIENCY VIRUS (HIV): ICD-10-CM

## 2019-05-21 RX ORDER — EMTRICITABINE AND TENOFOVIR DISOPROXIL FUMARATE 200; 300 MG/1; MG/1
TABLET, FILM COATED ORAL
Qty: 30 TABLET | Refills: 0 | OUTPATIENT
Start: 2019-05-21

## 2019-05-21 NOTE — TELEPHONE ENCOUNTER
Left detailed message for pt to call the lab he will be getting his labs drawn to get them done as soon as possible to get medication refill.

## 2019-05-23 LAB
ALBUMIN SERPL-MCNC: 4.5 G/DL (ref 3.6–5.1)
ALBUMIN/GLOB SERPL: 1.7 (CALC) (ref 1–2.5)
ALP SERPL-CCNC: 100 U/L (ref 40–115)
ALT SERPL-CCNC: 31 U/L (ref 9–46)
AST SERPL-CCNC: 27 U/L (ref 10–40)
BILIRUB SERPL-MCNC: 0.5 MG/DL (ref 0.2–1.2)
BUN SERPL-MCNC: 15 MG/DL (ref 7–25)
BUN/CREAT SERPL: NORMAL (CALC) (ref 6–22)
CALCIUM SERPL-MCNC: 9.3 MG/DL (ref 8.6–10.3)
CHLORIDE SERPL-SCNC: 103 MMOL/L (ref 98–110)
CO2 SERPL-SCNC: 24 MMOL/L (ref 20–32)
CREAT SERPL-MCNC: 1.05 MG/DL (ref 0.6–1.35)
EXTRA LAVENDER TOP TUBE: NORMAL
GFRSERPLBLD MDRD-ARVRAT: 91 ML/MIN/1.73M2
GLOBULIN SER CALC-MCNC: 2.7 G/DL (CALC) (ref 1.9–3.7)
GLUCOSE SERPL-MCNC: 82 MG/DL (ref 65–99)
HCV AB S/CO SERPL IA: 0.01
HCV AB SERPL QL IA: NORMAL
HIV 1+2 AB+HIV1 P24 AG SERPL QL IA: NORMAL
POTASSIUM SERPL-SCNC: 4.3 MMOL/L (ref 3.5–5.3)
PROT SERPL-MCNC: 7.2 G/DL (ref 6.1–8.1)
RPR SER QL: NORMAL
SODIUM SERPL-SCNC: 139 MMOL/L (ref 135–146)

## 2019-05-29 DIAGNOSIS — Z20.6 CONTACT WITH AND (SUSPECTED) EXPOSURE TO HUMAN IMMUNODEFICIENCY VIRUS (HIV): ICD-10-CM

## 2019-05-29 RX ORDER — EMTRICITABINE AND TENOFOVIR DISOPROXIL FUMARATE 200; 300 MG/1; MG/1
TABLET, FILM COATED ORAL
Qty: 30 TABLET | Refills: 2 | Status: SHIPPED | OUTPATIENT
Start: 2019-05-29 | End: 2019-08-27 | Stop reason: SDUPTHER

## 2019-05-29 RX ORDER — EMTRICITABINE AND TENOFOVIR DISOPROXIL FUMARATE 200; 300 MG/1; MG/1
1 TABLET, FILM COATED ORAL DAILY
Qty: 90 TABLET | Refills: 0 | Status: SHIPPED | OUTPATIENT
Start: 2019-05-29 | End: 2019-05-29 | Stop reason: SDUPTHER

## 2019-05-31 ENCOUNTER — PATIENT MESSAGE (OUTPATIENT)
Dept: FAMILY MEDICINE | Facility: CLINIC | Age: 39
End: 2019-05-31

## 2019-06-24 ENCOUNTER — PATIENT MESSAGE (OUTPATIENT)
Dept: FAMILY MEDICINE | Facility: CLINIC | Age: 39
End: 2019-06-24

## 2019-06-24 DIAGNOSIS — F41.9 ANXIETY: ICD-10-CM

## 2019-06-24 DIAGNOSIS — A60.00 RECURRENT GENITAL HERPES: ICD-10-CM

## 2019-06-24 DIAGNOSIS — R46.81 OBSESSIVE-COMPULSIVE BEHAVIOR: ICD-10-CM

## 2019-06-24 RX ORDER — VALACYCLOVIR HYDROCHLORIDE 500 MG/1
500 TABLET, FILM COATED ORAL DAILY
Qty: 90 TABLET | Refills: 0 | Status: SHIPPED | OUTPATIENT
Start: 2019-06-24 | End: 2019-08-17 | Stop reason: SDUPTHER

## 2019-06-24 RX ORDER — CITALOPRAM 20 MG/1
20 TABLET, FILM COATED ORAL DAILY
Qty: 90 TABLET | Refills: 0 | Status: SHIPPED | OUTPATIENT
Start: 2019-06-24 | End: 2019-08-02 | Stop reason: SDUPTHER

## 2019-06-28 ENCOUNTER — PATIENT MESSAGE (OUTPATIENT)
Dept: PRIMARY CARE CLINIC | Facility: CLINIC | Age: 39
End: 2019-06-28

## 2019-06-28 NOTE — PATIENT INSTRUCTIONS
Telemedicine Virtual Visits    What is a Virtual Visit?  A virtual visit is a secure video appointment with your provider via your smartphone or tablet.  This allows you to conduct a traditional office visit with your provider electronically through your [employer] russel without leaving home or work.    How much is a Virtual Visit?  If you have health insurance, your insurance will be billed for the virtual visit. If your insurance does not cover a virtual visit (or if you do not have health insurance), you will be responsible for a $54 fee. If your insurance covers the virtual visit, you will be responsible for your co-pay, like when you come in for an office visit. (More and more insurances are covering virtual visits. If you have questions about your insurance coverage for virtual visits, you will need to contact your health insurance provider.)    Preparing for your Virtual Visit  · You must have a mobile phone or tablet with iOS or Android operating system.  · Your device must have a front-facing camera.  · You must have the [employer] russel installed and [facility] selected as your healthcare provider.  · You can find the [employer] russel in the [employer] Store ([employer]) and Google Play Store ([employer]).  · If you need help with [employer], help is available:  · online at [email or web address]   at the O[facility]bar in the 1st floor lobby of [facility]  · or by phone at [phone]    E-Pre-Check  · Once your Virtual Visit is scheduled you will need to complete the ePre-Check in the [employer] russel before your visit.   · During ePre-Check you will verify your insurance, demographics and sign the Telehealth Consent form.        How to Start Your Virtual Visit  Once your ePre-check is complete, you will need to test your hardware prior to your scheduled appointment.    1. Select the box that has your upcoming Virtual Visit appointment.  2.  On the next screen select the 'Test Video' button on the bottom of  "the screen.  3.  If successful, you will receive a pop-up saying "You're all set."          Ready for your Virtual Visit Appointment   · Select 'Appointments' from the [employer] home screen.  · Find and select today's Virtual Visit Appointment.  · Select 'Begin Visit'. Once selected you will enter a virtual visit waiting room until your provider arrives.      "

## 2019-07-23 ENCOUNTER — PATIENT MESSAGE (OUTPATIENT)
Dept: INTERNAL MEDICINE | Facility: CLINIC | Age: 39
End: 2019-07-23

## 2019-07-25 ENCOUNTER — PATIENT MESSAGE (OUTPATIENT)
Dept: INTERNAL MEDICINE | Facility: CLINIC | Age: 39
End: 2019-07-25

## 2019-07-25 NOTE — PATIENT INSTRUCTIONS
"Telemedicine Virtual Visits    What is a Virtual Visit?  A virtual visit is a secure video appointment with your provider via your smartphone or tablet.  This allows you to conduct a traditional office visit with your provider electronically through your [employer] russel without leaving home or work.    How much is a Virtual Visit?  If you have health insurance, your insurance will be billed for the virtual visit. If your insurance does not cover a virtual visit (or if you do not have health insurance), you will be responsible for a $54 fee. If your insurance covers the virtual visit, you will be responsible for your co-pay, like when you come in for an office visit. (More and more insurances are covering virtual visits. If you have questions about your insurance coverage for virtual visits, you will need to contact your health insurance provider.)    Preparing for your Virtual Visit  · You must have a mobile phone or tablet with iOS or Android operating system.  · Your device must have a front-facing camera.  · You must have the [employer] russel installed and [facility] selected as your healthcare provider.  · You can find the [employer] russel in the [employer] Store ([email or web address]) and Google Play Store ([employer]).  · If you need help with [employer], help is available:  · online at [email or web address].[email or web address]  · or by phone at [phone]    E-Pre-Check  · Once your Virtual Visit is scheduled you will need to complete the ePre-Check in the [employer] russel before your visit.   · During ePre-Check you will verify your insurance, demographics and sign the Telehealth Consent form.        How to Start Your Virtual Visit  Once your ePre-check is complete, you will need to test your hardware prior to your scheduled appointment.    1. Select the box that has your upcoming Virtual Visit appointment.  2.  On the next screen select the 'Test Video' button on the bottom of the screen.  3.  If successful, you will receive a pop-up saying "You're all " "set."          Ready for your Virtual Visit Appointment   · Select 'Appointments' from the [employer] home screen.  · Find and select today's Virtual Visit Appointment.  · Select 'Begin Visit'. Once selected you will enter a virtual visit waiting room until your provider arrives.      "

## 2019-08-02 ENCOUNTER — PATIENT MESSAGE (OUTPATIENT)
Dept: PRIMARY CARE CLINIC | Facility: CLINIC | Age: 39
End: 2019-08-02

## 2019-08-02 ENCOUNTER — OFFICE VISIT (OUTPATIENT)
Dept: PRIMARY CARE CLINIC | Facility: CLINIC | Age: 39
End: 2019-08-02
Payer: COMMERCIAL

## 2019-08-02 DIAGNOSIS — Z51.81 ENCOUNTER FOR THERAPEUTIC DRUG MONITORING: ICD-10-CM

## 2019-08-02 DIAGNOSIS — Z11.3 ENCOUNTER FOR SCREENING EXAMINATION FOR SEXUALLY TRANSMITTED DISEASE: ICD-10-CM

## 2019-08-02 DIAGNOSIS — Z20.6 CONTACT WITH AND (SUSPECTED) EXPOSURE TO HUMAN IMMUNODEFICIENCY VIRUS (HIV): Primary | ICD-10-CM

## 2019-08-02 DIAGNOSIS — A60.00 RECURRENT GENITAL HERPES: ICD-10-CM

## 2019-08-02 DIAGNOSIS — Z79.899 ENCOUNTER FOR LONG-TERM (CURRENT) DRUG USE: ICD-10-CM

## 2019-08-02 DIAGNOSIS — F41.9 ANXIETY: ICD-10-CM

## 2019-08-02 PROCEDURE — 99444 PR PHYSICIAN ONLINE EVALUATION & MANAGEMENT SERVICE: ICD-10-PCS | Mod: 95,,, | Performed by: INTERNAL MEDICINE

## 2019-08-02 PROCEDURE — 99444 PR PHYSICIAN ONLINE EVALUATION & MANAGEMENT SERVICE: CPT | Mod: 95,,, | Performed by: INTERNAL MEDICINE

## 2019-08-02 RX ORDER — CITALOPRAM 20 MG/1
20 TABLET, FILM COATED ORAL DAILY
Qty: 90 TABLET | Refills: 1 | Status: SHIPPED | OUTPATIENT
Start: 2019-08-02 | End: 2020-04-04

## 2019-08-02 RX ORDER — DOXYCYCLINE HYCLATE 100 MG
TABLET ORAL
COMMUNITY
Start: 2019-08-01 | End: 2020-05-04

## 2019-08-02 NOTE — PROGRESS NOTES
HIV PrEP FOLLOW UP VISIT CONSULT NOTE    SUBJECTIVE:    The patient location is:  Patient Home   Visit type: Virtual visit with synchronous audio and video  Total time spent with patient: 25 min  Each patient to whom he or she provides medical services by telemedicine is:  (1) informed of the relationship between the physician and patient and the respective role of any other health care provider with respect to management of the patient; and (2) notified that he or she may decline to receive medical services by telemedicine and may withdraw from such care at any time.    This is a/an 38 y.o. male here for primary care visit for  Chief Complaint   Patient presents with    PrEP     General Sexual Behavior Evaluation  HIV PrEP Questionnaire 7/26/2019   Do you anticipate a major change in your income in the next 3 months?  No   Do you anticipate a major change in your medical insurance in the next 3 months? No   Do you anticipate a major change in your place of residence in the next 3 months? No   In the past 3 months, my use of PrEP can be best described as Missing 0 doses per week.   In the past 3 weeks have you had brand new symptoms such as fever, fatigue, body aches, sore throat, headache, or diarrhea? No   Vaginal, Anal, or Oral sex (giving or receiving)? Man   without using a condom Man   with someone who was HIV positive Man   Since your last visit, have you engaged in the following behaviors? sex with 1 partner   syphilis No   gonorrhea No   chlamydia No   herpes No   trichomonas No   other No   unsure  No   genital sores / wounds No   unusual sores on tongue, lips, throat No   groin tenderness  No   painful urination No   abnormal genital discharge No   unusual rectal pain or discharge No   rash on palms of the hands / soles feet No   unusual rash elsewhere on the body No   How much do you agree with this statement: Condoms make sex less enjoyable? undecided   Do  you often (2 or more times/week) take  NSAID medicines? No   Some recent data might be hidden     Patient wants to continue citalopram 20 mg.  No unwanted side effects.  States that it helps with daily anxiety.  Anxiety triggers frequent episodes of genital herpes simplex which is why he takes valacyclovir daily 500 mg to help suppress what was frequent genital outbreaks.  Doxycycline was recently prescribed by a dermatologist for rosacea like illness.  Not for indication of sexually transmitted infection.    STD History  · Detailed HIV/STI risk reduction counseling completed today Yes   · Discussion of U=U.  Patient partner is still undetectable on anti-retroviral therapy.  They good communication regarding his ongoing HIV care.  Mutually monogamous relationship.  No current plans to open the relationship.      Condom Use  · Detailed counseling relating to condom use completed today Yes   · Consistent negative attitudes regarding use of condoms for penetrative sex.  [place] monogamous as the method for preventing bacterial STI.  Truvada to help prevent HIV.  Patient has general anxiety and feels that Truvada helps anticipatory anxiety about sex better than any other treatment that he has had.  Risks and benefits and alternatives discussed again patient continues to want Truvada as his preferred HIV methods of prevention      Substance History  · Detailed counseling relating to substance use to decrease HIV/STI risk completed today Yes   · Recent increase in alcohol.  1 drink most nights. More frequently than before.        Medications Reviewed and Updated    Past medical, family, and social histories were reviewed and updated.    Review of Systems negative unless noted otherwise in history of present illness-  ROS    Allergic:  Review of patient's allergies indicates:  No Known Allergies    OBJECTIVE:    General:  Well-appearing well-nourished  Psychiatric:  No apparent distress. Normal thought content.  Nonpressured speech.    Pertinent Labs Reviewed        ASSESSMENT/PLAN:    ASSESSMENT  - HIV PrEP compliance reviewed today and deemed to be Adequate  - reviewed patient home medications for new interactions with Truvada PreP     PLAN  - temporary suspension of HIV PreP indicated  no  - intensive compliance counseling indicated  no  - repeat HIV labs in 3 months will be scheduled today   - need for repeat renal labs (sCR, UA) reviewed today. Labs indicated today yes   - repeat HCV Ab testing indicated today no   - repeat Hep B serology testing indicated no     STI Management Plan  - patient was screened for STI signs and symptoms today  - appropriate action will be taken based on today's findings.   PLAN   - patient counseled on safer sex methods today  - HAV/HBV/HPV vax reviewed for completeness yes    HIV Pre-Exposure Prohylaxis (PrEP)  -     HIV 1/2 Ag/Ab (4th Gen); Future; Expected date: 08/02/2019    Encounter for screening examination for sexually transmitted disease  -     RPR; Future; Expected date: 08/02/2019    Encounter for therapeutic drug monitoring  Comments:  PrEP monitoring (renal, bone studies)   Orders:  -     Comprehensive metabolic panel; Future; Expected date: 08/02/2019  -     URINALYSIS; Future; Expected date: 08/02/2019  -     MICROALBUMIN / CREATININE RATIO URINE; Future; Expected date: 08/02/2019    Encounter for long-term (current) drug use  -     Comprehensive metabolic panel; Future; Expected date: 08/02/2019  -     URINALYSIS; Future; Expected date: 08/02/2019  -     MICROALBUMIN / CREATININE RATIO URINE; Future; Expected date: 08/02/2019    Recurrent genital herpes.Condition stable.  Counseling given today on self-care measures. Plan to monitor clinically. Continue current medical plan.     Anxiety.Condition stable.  Counseling given today on self-care measures. Plan to monitor clinically. Continue current medical plan.   -     citalopram (CELEXA) 20 MG tablet; Take 1 tablet (20 mg total) by mouth once daily.  Dispense: 90 tablet;  Refill: 1        No future appointments.    Tani Giron  8/2/2019  12:41 PM

## 2019-08-05 ENCOUNTER — PATIENT MESSAGE (OUTPATIENT)
Dept: INTERNAL MEDICINE | Facility: CLINIC | Age: 39
End: 2019-08-05

## 2019-08-05 ENCOUNTER — PATIENT MESSAGE (OUTPATIENT)
Dept: PRIMARY CARE CLINIC | Facility: CLINIC | Age: 39
End: 2019-08-05

## 2019-08-17 DIAGNOSIS — A60.00 RECURRENT GENITAL HERPES: ICD-10-CM

## 2019-08-19 ENCOUNTER — PATIENT MESSAGE (OUTPATIENT)
Dept: INTERNAL MEDICINE | Facility: CLINIC | Age: 39
End: 2019-08-19

## 2019-08-19 RX ORDER — VALACYCLOVIR HYDROCHLORIDE 500 MG/1
TABLET, FILM COATED ORAL
Qty: 90 TABLET | Refills: 0 | Status: SHIPPED | OUTPATIENT
Start: 2019-08-19 | End: 2019-12-05 | Stop reason: SDUPTHER

## 2019-08-22 ENCOUNTER — PATIENT MESSAGE (OUTPATIENT)
Dept: PRIMARY CARE CLINIC | Facility: CLINIC | Age: 39
End: 2019-08-22

## 2019-08-26 DIAGNOSIS — Z51.81 MEDICATION MONITORING ENCOUNTER: Primary | ICD-10-CM

## 2019-08-26 DIAGNOSIS — R80.9 MICROALBUMINURIA: ICD-10-CM

## 2019-08-27 DIAGNOSIS — Z51.81 MEDICATION MONITORING ENCOUNTER: ICD-10-CM

## 2019-08-27 DIAGNOSIS — Z72.51 HIGH RISK SEXUAL BEHAVIOR, UNSPECIFIED TYPE: ICD-10-CM

## 2019-08-27 DIAGNOSIS — Z20.6 CONTACT WITH AND (SUSPECTED) EXPOSURE TO HUMAN IMMUNODEFICIENCY VIRUS (HIV): ICD-10-CM

## 2019-08-27 DIAGNOSIS — R80.9 MICROALBUMINURIA: ICD-10-CM

## 2019-08-27 DIAGNOSIS — Z11.3 ENCOUNTER FOR SCREENING EXAMINATION FOR SEXUALLY TRANSMITTED DISEASE: ICD-10-CM

## 2019-08-27 DIAGNOSIS — Z20.6 CONTACT WITH AND (SUSPECTED) EXPOSURE TO HUMAN IMMUNODEFICIENCY VIRUS (HIV): Primary | ICD-10-CM

## 2019-08-27 RX ORDER — EMTRICITABINE AND TENOFOVIR DISOPROXIL FUMARATE 200; 300 MG/1; MG/1
1 TABLET, FILM COATED ORAL DAILY
Qty: 30 TABLET | Refills: 2 | Status: SHIPPED | OUTPATIENT
Start: 2019-08-27 | End: 2019-11-23 | Stop reason: SDUPTHER

## 2019-09-06 ENCOUNTER — PATIENT MESSAGE (OUTPATIENT)
Dept: PRIMARY CARE CLINIC | Facility: CLINIC | Age: 39
End: 2019-09-06

## 2019-09-06 LAB
ALBUMIN/CREAT UR: <11.8 MG/G CREAT (ref 0–30)
CREAT UR-MCNC: 102.1 MG/DL
MICROALBUMIN UR-MCNC: <12 UG/ML

## 2019-09-07 LAB
ALBUMIN SERPL-MCNC: 5 G/DL (ref 3.5–5.5)
ALBUMIN/GLOB SERPL: 1.7 {RATIO} (ref 1.2–2.2)
ALP SERPL-CCNC: 97 IU/L (ref 39–117)
ALT SERPL-CCNC: 93 IU/L (ref 0–44)
AST SERPL-CCNC: 86 IU/L (ref 0–40)
BILIRUB SERPL-MCNC: 0.2 MG/DL (ref 0–1.2)
BUN SERPL-MCNC: 23 MG/DL (ref 6–20)
BUN/CREAT SERPL: 17 (ref 9–20)
CALCIUM SERPL-MCNC: 9.8 MG/DL (ref 8.7–10.2)
CHLORIDE SERPL-SCNC: 101 MMOL/L (ref 96–106)
CO2 SERPL-SCNC: 24 MMOL/L (ref 20–29)
CREAT SERPL-MCNC: 1.37 MG/DL (ref 0.76–1.27)
GLOBULIN SER CALC-MCNC: 2.9 G/DL (ref 1.5–4.5)
GLUCOSE SERPL-MCNC: 80 MG/DL (ref 65–99)
HIV 1+2 AB+HIV1 P24 AG SERPL QL IA: NON REACTIVE
POTASSIUM SERPL-SCNC: 4.3 MMOL/L (ref 3.5–5.2)
PROT SERPL-MCNC: 7.9 G/DL (ref 6–8.5)
RPR SER QL: NON REACTIVE
SODIUM SERPL-SCNC: 142 MMOL/L (ref 134–144)

## 2019-09-11 ENCOUNTER — PATIENT MESSAGE (OUTPATIENT)
Dept: PRIMARY CARE CLINIC | Facility: CLINIC | Age: 39
End: 2019-09-11

## 2019-09-11 LAB
C TRACH RRNA NPH QL NAA+PROBE: NEGATIVE
N GONORRHOEA RRNA NPH QL NAA+PROBE: POSITIVE

## 2019-09-12 ENCOUNTER — PATIENT MESSAGE (OUTPATIENT)
Dept: PRIMARY CARE CLINIC | Facility: CLINIC | Age: 39
End: 2019-09-12

## 2019-09-12 LAB
C TRACH DNA ANORECTL QL NAA+PROBE: NEGATIVE
N GONORRHOEA RRNA ANORECTL QL NAA+PROBE: NEGATIVE

## 2019-09-14 ENCOUNTER — PATIENT MESSAGE (OUTPATIENT)
Dept: PRIMARY CARE CLINIC | Facility: CLINIC | Age: 39
End: 2019-09-14

## 2019-11-07 ENCOUNTER — TELEPHONE (OUTPATIENT)
Dept: INTERNAL MEDICINE | Facility: CLINIC | Age: 39
End: 2019-11-07

## 2019-11-08 DIAGNOSIS — R80.9 MICROALBUMINURIA: ICD-10-CM

## 2019-11-08 DIAGNOSIS — Z11.3 ENCOUNTER FOR SCREENING EXAMINATION FOR SEXUALLY TRANSMITTED DISEASE: ICD-10-CM

## 2019-11-08 DIAGNOSIS — Z72.51 HIGH RISK SEXUAL BEHAVIOR, UNSPECIFIED TYPE: ICD-10-CM

## 2019-11-08 DIAGNOSIS — Z51.81 MEDICATION MONITORING ENCOUNTER: ICD-10-CM

## 2019-11-08 DIAGNOSIS — Z20.6 CONTACT WITH AND (SUSPECTED) EXPOSURE TO HUMAN IMMUNODEFICIENCY VIRUS (HIV): ICD-10-CM

## 2019-11-13 ENCOUNTER — TELEPHONE (OUTPATIENT)
Dept: INTERNAL MEDICINE | Facility: CLINIC | Age: 39
End: 2019-11-13

## 2019-11-13 NOTE — TELEPHONE ENCOUNTER
LM for patient that labs were sent to [facility] and to call back to schedule a f/u appt 5-7 days after labs done

## 2019-11-13 NOTE — TELEPHONE ENCOUNTER
----- Message from Tani Giron MD sent at 11/8/2019 11:04 AM CST -----  Contact: Pt  Please contact patient letting him know that the following labs have been released to lab  and to get them done 5-7 days before a telemedicine visit with me ago over sexual health and wellness.  The appointment should be sometime November or early December    Labs   HIV, CMP, G/C throat/rectal, RPR            ----- Message -----  From: Triny Edouard  Sent: 11/8/2019   9:32 AM CST  To: [facility] Staff    Type:  Patient Returning Call    Who Called: ROYCE CEE [[id number]]    Who Left Message for Patient:Shannan Salinas LPN    Does the patient know what this is regarding?:patient would like to have order sent to [facility] Please contact to further discuss and advise    Best Call Back Number:[phone]    Additional Information: no

## 2019-11-22 ENCOUNTER — TELEPHONE (OUTPATIENT)
Dept: INTERNAL MEDICINE | Facility: CLINIC | Age: 39
End: 2019-11-22

## 2019-11-22 LAB
ALBUMIN SERPL-MCNC: 5.2 G/DL (ref 3.5–5.5)
ALBUMIN/GLOB SERPL: 1.8 {RATIO} (ref 1.2–2.2)
ALP SERPL-CCNC: 115 IU/L (ref 39–117)
ALT SERPL-CCNC: 41 IU/L (ref 0–44)
AST SERPL-CCNC: 40 IU/L (ref 0–40)
BILIRUB SERPL-MCNC: 0.5 MG/DL (ref 0–1.2)
BUN SERPL-MCNC: 16 MG/DL (ref 6–20)
BUN/CREAT SERPL: 13 (ref 9–20)
CALCIUM SERPL-MCNC: 10.1 MG/DL (ref 8.7–10.2)
CHLORIDE SERPL-SCNC: 101 MMOL/L (ref 96–106)
CO2 SERPL-SCNC: 23 MMOL/L (ref 20–29)
CREAT SERPL-MCNC: 1.28 MG/DL (ref 0.76–1.27)
GLOBULIN SER CALC-MCNC: 2.9 G/DL (ref 1.5–4.5)
GLUCOSE SERPL-MCNC: 94 MG/DL (ref 65–99)
POTASSIUM SERPL-SCNC: 4.9 MMOL/L (ref 3.5–5.2)
PROT SERPL-MCNC: 8.1 G/DL (ref 6–8.5)
RPR SER QL: NON REACTIVE
SODIUM SERPL-SCNC: 141 MMOL/L (ref 134–144)

## 2019-11-22 NOTE — TELEPHONE ENCOUNTER
----- Message from Judit Johnson sent at 11/22/2019 12:01 PM CST -----  Contact: Self [phone]  Patient Returning Your Phone Call

## 2019-11-23 DIAGNOSIS — Z20.6 CONTACT WITH AND (SUSPECTED) EXPOSURE TO HUMAN IMMUNODEFICIENCY VIRUS (HIV): ICD-10-CM

## 2019-11-23 LAB — HIV 1+2 AB+HIV1 P24 AG SERPL QL IA: NON REACTIVE

## 2019-11-23 RX ORDER — EMTRICITABINE AND TENOFOVIR DISOPROXIL FUMARATE 200; 300 MG/1; MG/1
1 TABLET, FILM COATED ORAL DAILY
Qty: 30 TABLET | Refills: 2 | Status: SHIPPED | OUTPATIENT
Start: 2019-11-23 | End: 2020-04-29 | Stop reason: SDUPTHER

## 2019-11-23 NOTE — PROGRESS NOTES
Thanks for completing your quarterly HIV screening test. Results are normal so we have renewed HIV PrEP medication. You may be due for other services including a visit with your PrEP provider so please watch your [facility] account closely over the coming days

## 2019-11-24 LAB
C TRACH DNA ANORECTL QL NAA+PROBE: NEGATIVE
C TRACH RRNA NPH QL NAA+PROBE: NEGATIVE
N GONORRHOEA RRNA ANORECTL QL NAA+PROBE: NEGATIVE
N GONORRHOEA RRNA NPH QL NAA+PROBE: NEGATIVE

## 2019-12-05 DIAGNOSIS — A60.00 RECURRENT GENITAL HERPES: ICD-10-CM

## 2019-12-05 RX ORDER — VALACYCLOVIR HYDROCHLORIDE 500 MG/1
TABLET, FILM COATED ORAL
Qty: 90 TABLET | Refills: 0 | Status: SHIPPED | OUTPATIENT
Start: 2019-12-05 | End: 2020-04-04

## 2020-01-20 ENCOUNTER — TELEPHONE (OUTPATIENT)
Dept: PRIMARY CARE CLINIC | Facility: CLINIC | Age: 40
End: 2020-01-20

## 2020-01-20 NOTE — TELEPHONE ENCOUNTER
Received a fax from [employer] stating they have made several attempts to contact the patient to fill his or her truvada 200-300 mg  Order # [id number] fax [phone] or call [phone]

## 2020-01-23 ENCOUNTER — PATIENT MESSAGE (OUTPATIENT)
Dept: PRIMARY CARE CLINIC | Facility: CLINIC | Age: 40
End: 2020-01-23

## 2020-01-23 DIAGNOSIS — Z51.81 MEDICATION MONITORING ENCOUNTER: ICD-10-CM

## 2020-01-23 DIAGNOSIS — Z72.51 HIGH RISK SEXUAL BEHAVIOR, UNSPECIFIED TYPE: ICD-10-CM

## 2020-01-23 DIAGNOSIS — R80.9 MICROALBUMINURIA: ICD-10-CM

## 2020-01-23 DIAGNOSIS — Z20.6 CONTACT WITH AND (SUSPECTED) EXPOSURE TO HUMAN IMMUNODEFICIENCY VIRUS (HIV): ICD-10-CM

## 2020-01-23 DIAGNOSIS — Z11.3 ENCOUNTER FOR SCREENING EXAMINATION FOR SEXUALLY TRANSMITTED DISEASE: ICD-10-CM

## 2020-01-23 NOTE — TELEPHONE ENCOUNTER
Attempted to contact patient to verify if he would like to continue prep services. If so, inform Dr. Giron to schedule labs. Left message left to contact the office.

## 2020-01-24 ENCOUNTER — TELEPHONE (OUTPATIENT)
Dept: INTERNAL MEDICINE | Facility: CLINIC | Age: 40
End: 2020-01-24

## 2020-01-24 NOTE — TELEPHONE ENCOUNTER
----- Message from Tani Giron MD sent at 1/23/2020  5:00 PM CST -----  Contact: ROYCE CEE [[id number]]  Contact patient letting him know that I have sent detailed information through [facility] to coordinate getting his laboratory testing done.  If he needs help getting back into [facility] please help him.    ----- Message -----  From: Demetria Maddox  Sent: 1/23/2020   1:56 PM CST  To: Mack WILLOUGHBY Staff    Type:  Patient Returning Call    Who Called: ROYCE CEE [[id number]]    Who Left Message for Patient: Raul     Does the patient know what this is regarding?: He states that Raul can send the labs over to the same facility to have the labs drawn.     Can the clinic reply in [facility]: Yes    Best Call Back Number: [phone]    Additional Information: N/A

## 2020-01-24 NOTE — TELEPHONE ENCOUNTER
Spoke to Pt and he confirmed he is going to do labs as ordered and will do them at [facility]    Pt said he was already called earlier

## 2020-03-16 DIAGNOSIS — Z20.6 CONTACT WITH AND (SUSPECTED) EXPOSURE TO HUMAN IMMUNODEFICIENCY VIRUS (HIV): ICD-10-CM

## 2020-03-16 RX ORDER — EMTRICITABINE AND TENOFOVIR DISOPROXIL FUMARATE 200; 300 MG/1; MG/1
TABLET, FILM COATED ORAL
Qty: 30 TABLET | Refills: 2 | OUTPATIENT
Start: 2020-03-16

## 2020-03-31 ENCOUNTER — PATIENT MESSAGE (OUTPATIENT)
Dept: PRIMARY CARE CLINIC | Facility: CLINIC | Age: 40
End: 2020-03-31

## 2020-03-31 DIAGNOSIS — Z20.6 CONTACT WITH AND (SUSPECTED) EXPOSURE TO HUMAN IMMUNODEFICIENCY VIRUS (HIV): ICD-10-CM

## 2020-03-31 RX ORDER — EMTRICITABINE AND TENOFOVIR DISOPROXIL FUMARATE 200; 300 MG/1; MG/1
TABLET, FILM COATED ORAL
Qty: 30 TABLET | Refills: 2 | OUTPATIENT
Start: 2020-03-31

## 2020-04-04 DIAGNOSIS — F41.9 ANXIETY: ICD-10-CM

## 2020-04-04 DIAGNOSIS — A60.00 RECURRENT GENITAL HERPES: ICD-10-CM

## 2020-04-04 RX ORDER — CITALOPRAM 20 MG/1
TABLET, FILM COATED ORAL
Qty: 90 TABLET | Refills: 0 | Status: SHIPPED | OUTPATIENT
Start: 2020-04-04 | End: 2020-04-29 | Stop reason: SDUPTHER

## 2020-04-04 RX ORDER — VALACYCLOVIR HYDROCHLORIDE 500 MG/1
TABLET, FILM COATED ORAL
Qty: 90 TABLET | Refills: 0 | Status: SHIPPED | OUTPATIENT
Start: 2020-04-04 | End: 2020-04-29 | Stop reason: SDUPTHER

## 2020-04-29 ENCOUNTER — PATIENT MESSAGE (OUTPATIENT)
Dept: PRIMARY CARE CLINIC | Facility: CLINIC | Age: 40
End: 2020-04-29

## 2020-04-29 ENCOUNTER — TELEPHONE (OUTPATIENT)
Dept: INTERNAL MEDICINE | Facility: CLINIC | Age: 40
End: 2020-04-29

## 2020-04-29 DIAGNOSIS — A60.00 RECURRENT GENITAL HERPES: ICD-10-CM

## 2020-04-29 DIAGNOSIS — F41.9 ANXIETY: ICD-10-CM

## 2020-04-29 DIAGNOSIS — Z20.6 CONTACT WITH AND (SUSPECTED) EXPOSURE TO HUMAN IMMUNODEFICIENCY VIRUS (HIV): ICD-10-CM

## 2020-04-29 LAB
ALBUMIN SERPL-MCNC: 4.9 G/DL (ref 4–5)
ALBUMIN/GLOB SERPL: 1.8 {RATIO} (ref 1.2–2.2)
ALP SERPL-CCNC: 111 IU/L (ref 39–117)
ALT SERPL-CCNC: 42 IU/L (ref 0–44)
AST SERPL-CCNC: 29 IU/L (ref 0–40)
BILIRUB SERPL-MCNC: 0.3 MG/DL (ref 0–1.2)
BUN SERPL-MCNC: 15 MG/DL (ref 6–20)
BUN/CREAT SERPL: 12 (ref 9–20)
C TRACH DNA ANORECTL QL NAA+PROBE: NEGATIVE
C TRACH RRNA NPH QL NAA+PROBE: NEGATIVE
CALCIUM SERPL-MCNC: 9.6 MG/DL (ref 8.7–10.2)
CHLORIDE SERPL-SCNC: 102 MMOL/L (ref 96–106)
CO2 SERPL-SCNC: 24 MMOL/L (ref 20–29)
CREAT SERPL-MCNC: 1.28 MG/DL (ref 0.76–1.27)
GLOBULIN SER CALC-MCNC: 2.8 G/DL (ref 1.5–4.5)
GLUCOSE SERPL-MCNC: 87 MG/DL (ref 65–99)
HCV AB S/CO SERPL IA: 0.2 S/CO RATIO (ref 0–0.9)
HIV 1+2 AB+HIV1 P24 AG SERPL QL IA: NON REACTIVE
N GONORRHOEA RRNA ANORECTL QL NAA+PROBE: NEGATIVE
N GONORRHOEA RRNA NPH QL NAA+PROBE: NEGATIVE
POTASSIUM SERPL-SCNC: 4.3 MMOL/L (ref 3.5–5.2)
PROT SERPL-MCNC: 7.7 G/DL (ref 6–8.5)
RPR SER QL: REACTIVE
RPR SER-TITR: ABNORMAL {TITER}
SODIUM SERPL-SCNC: 141 MMOL/L (ref 134–144)

## 2020-04-29 RX ORDER — VALACYCLOVIR HYDROCHLORIDE 500 MG/1
500 TABLET, FILM COATED ORAL DAILY
Qty: 90 TABLET | Refills: 1 | Status: SHIPPED | OUTPATIENT
Start: 2020-04-29 | End: 2022-07-08 | Stop reason: SDUPTHER

## 2020-04-29 RX ORDER — EMTRICITABINE AND TENOFOVIR DISOPROXIL FUMARATE 200; 300 MG/1; MG/1
1 TABLET, FILM COATED ORAL DAILY
Qty: 30 TABLET | Refills: 2 | Status: SHIPPED | OUTPATIENT
Start: 2020-04-29

## 2020-04-29 RX ORDER — CITALOPRAM 20 MG/1
20 TABLET, FILM COATED ORAL DAILY
Qty: 90 TABLET | Refills: 1 | Status: SHIPPED | OUTPATIENT
Start: 2020-04-29

## 2020-04-30 ENCOUNTER — PATIENT MESSAGE (OUTPATIENT)
Dept: INTERNAL MEDICINE | Facility: CLINIC | Age: 40
End: 2020-04-30

## 2020-05-04 ENCOUNTER — OFFICE VISIT (OUTPATIENT)
Dept: INTERNAL MEDICINE | Facility: CLINIC | Age: 40
End: 2020-05-04
Payer: COMMERCIAL

## 2020-05-04 DIAGNOSIS — Z20.6 CONTACT WITH AND (SUSPECTED) EXPOSURE TO HUMAN IMMUNODEFICIENCY VIRUS (HIV): Primary | ICD-10-CM

## 2020-05-04 DIAGNOSIS — Z11.3 ENCOUNTER FOR SCREENING EXAMINATION FOR SEXUALLY TRANSMITTED DISEASE: ICD-10-CM

## 2020-05-04 DIAGNOSIS — Z51.81 MEDICATION MONITORING ENCOUNTER: ICD-10-CM

## 2020-05-04 DIAGNOSIS — A51.9 EARLY SYPHILIS: ICD-10-CM

## 2020-05-04 PROCEDURE — 99214 PR OFFICE/OUTPT VISIT, EST, LEVL IV, 30-39 MIN: ICD-10-PCS | Mod: 95,,, | Performed by: INTERNAL MEDICINE

## 2020-05-04 PROCEDURE — 99214 OFFICE O/P EST MOD 30 MIN: CPT | Mod: 95,,, | Performed by: INTERNAL MEDICINE

## 2020-05-04 RX ORDER — DOXYCYCLINE 100 MG/1
100 CAPSULE ORAL 2 TIMES DAILY
Qty: 28 CAPSULE | Refills: 0 | Status: SHIPPED | OUTPATIENT
Start: 2020-05-04 | End: 2020-05-18

## 2020-05-04 NOTE — PROGRESS NOTES
The patient location is: home  Visit type: Virtual visit with synchronous audio and video  Total time spent with patient: 20 min    Each patient to whom he or she provides medical services by telemedicine is:  (1) informed of the relationship between the physician and patient and the respective role of any other health care provider with respect to management of the patient; and (2) notified that he or she may decline to receive medical services by telemedicine and may withdraw from such care at any time.       Portions of this note are generated with voice recognition software. Typographical errors may exist.     SUBJECTIVE:    This is a/an 39 y.o. male here for primary care visit for  Chief Complaint   Patient presents with    Exposure to STD     Patient states that he did have a casual sexual contact January 2020. Partner is unknown to him and cannot help with contact tracing. His partner was also part of the sexual encounter. Neither had typical symptoms of primary or secondary syphilis. Partner is seeking evaluation now for presumed syphilis exposure.       Patient states that although he has had doxycycline treatments in the past year for rosacea the patient has not had any recent utilization of the medication.  States that last utilization was summer of 2019 and he does not recall self-medication with doxycycline or another antibiotic.    patient remarks that at some point when he was in care with [facility],  Potentially at the 1st visit for prep services he had a low level RPR titer and was diagnosed with late latent syphilis and was treated appropriately at that time.  However in the subsequent years the patient has had repeated RPR levels checked without serofast status    Partner on Biktarvy,  Recent change in anti-retroviral therapy for his HIV.  Not because of treatment failure.   Patient understands U =U  But wants to continue taking prep to provide additional reassurance because he  does not use condoms with primary partner.    Discussed Descovy and that there is no clear indication in his situation for changing        Medications Reviewed and Updated    Past medical, family, and social histories were reviewed and updated.    Review of Systems negative unless otherwise noted in history of present illness-  ROS    General ROS: negative  Psychological ROS: negative  ENT ROS: negative  Endocrine ROS: Negative  Allergy and Immunology ROS: negative  Cardiovascular ROS: negative  Pulmonary ROS: Negative  Gastrointestinal ROS: negative  Genito-Urinary ROS: negative  Musculoskeletal ROS: negative      Allergic:  Review of patient's allergies indicates:  No Known Allergies    OBJECTIVE:         Wt Readings from Last 3 Encounters:   03/22/18 88.3 kg (194 lb 10.7 oz)   01/11/18 90.5 kg (199 lb 8.3 oz)   12/20/17 89.4 kg (197 lb 1.5 oz)    There is no height or weight on file to calculate BMI.  Previous Blood Pressure Readings :   BP Readings from Last 3 Encounters:   03/22/18 136/80   01/11/18 120/80   12/20/17 112/78       Physical Exam    GEN: No apparent distress    Pertinent Labs Reviewed       ASSESSMENT/PLAN:    HIV Pre-Exposure Prohylaxis (PrEP).Condition stable.  Counseling given today on self-care measures. Plan to monitor clinically. Continue current medical plan.   -     HIV 1/2 Ag/Ab (4th Gen)  -     HIV 1/2 Ag/Ab (4th Gen)    Early syphilis.Condition not optimally controlled. Detailed counseling on self care measures. Plan to monitor clinically in addition to plan below or as listed on After Visit Summary.   -     doxycycline (MONODOX) 100 MG capsule; Take 1 capsule (100 mg total) by mouth 2 (two) times daily. for 14 days  Dispense: 28 capsule; Refill: 0      Medication monitoring encounter  -     Comprehensive metabolic panel  -     Comprehensive metabolic panel  -     Microalbumin/creatinine urine ratio    Encounter for screening examination for sexually transmitted disease  -     RPR  -      HIV 1/2 Ag/Ab (4th Gen)  -     RPR  -     HIV 1/2 Ag/Ab (4th Gen)          Future Appointments   Date Time Provider Department Center   7/6/2020  4:00 PM Tani Giron MD [facility]       Tani Giron  5/5/2020  3:25 PM

## 2020-07-07 ENCOUNTER — HISTORICAL (OUTPATIENT)
Dept: ADMINISTRATIVE | Facility: HOSPITAL | Age: 40
End: 2020-07-07

## 2020-07-07 LAB
ALBUMIN SERPL-MCNC: 4.9 GM/DL (ref 3.5–5)
ALBUMIN/GLOB SERPL: 1.4 RATIO (ref 1.1–2)
ALP SERPL-CCNC: 86 UNIT/L (ref 40–150)
ALT SERPL-CCNC: 45 UNIT/L (ref 0–55)
AST SERPL-CCNC: 33 UNIT/L (ref 5–34)
BILIRUB SERPL-MCNC: 0.3 MG/DL
BILIRUBIN DIRECT+TOT PNL SERPL-MCNC: 0.1 MG/DL (ref 0–0.5)
BILIRUBIN DIRECT+TOT PNL SERPL-MCNC: 0.2 MG/DL (ref 0–0.8)
BUN SERPL-MCNC: 18.6 MG/DL (ref 8.9–20.6)
CALCIUM SERPL-MCNC: 9.9 MG/DL (ref 8.4–10.2)
CHLORIDE SERPL-SCNC: 103 MMOL/L (ref 98–107)
CO2 SERPL-SCNC: 25 MMOL/L (ref 22–29)
CREAT SERPL-MCNC: 1.14 MG/DL (ref 0.73–1.18)
GLOBULIN SER-MCNC: 3.4 GM/DL (ref 2.4–3.5)
GLUCOSE SERPL-MCNC: 87 MG/DL (ref 74–100)
HAV IGM SERPL QL IA: NONREACTIVE
HBV CORE IGM SERPL QL IA: NONREACTIVE
HBV SURFACE AG SERPL QL IA: NONREACTIVE
HCV AB SERPL QL IA: NONREACTIVE
HEPATITIS PANEL INTERP: NORMAL
HIV 1+2 AB+HIV1 P24 AG SERPL QL IA: NONREACTIVE
POTASSIUM SERPL-SCNC: 4.6 MMOL/L (ref 3.5–5.1)
PROT SERPL-MCNC: 8.3 GM/DL (ref 6.4–8.3)
SODIUM SERPL-SCNC: 140 MMOL/L (ref 136–145)

## 2020-07-14 ENCOUNTER — HISTORICAL (OUTPATIENT)
Dept: ADMINISTRATIVE | Facility: HOSPITAL | Age: 40
End: 2020-07-14

## 2020-07-14 LAB
RPR SER QL: NORMAL
T PALLIDUM AB SER QL: REACTIVE

## 2020-07-30 ENCOUNTER — DOCUMENTATION ONLY (OUTPATIENT)
Dept: INTERNAL MEDICINE | Facility: CLINIC | Age: 40
End: 2020-07-30

## 2020-07-30 NOTE — PROGRESS NOTES
PrEP Initiation Date:   06/16    PrEP Actions Due:  Labs due NOW, HIV, CMP, UA, GC/CT, RPR (ensure reinfection did not occur)   Monitor eGFR closer, consider change to Descovy  Assess outside partners, strongest reason to continue  Hep A Vax  2/2 due       PrEP History    Prior Indication(s):  Partner PLWH on treatment. Patient anxious without PrEP. Occasional outside partners    Agent / Tolerability:    Current Supply:  Refill to 07/29 [facility] - [place] [id number]    Pharmacy Restrictions:  [facility] [place] [id number]    Prior Adherence Obstacles:  Lack of follow up on labs    Risk Factors for Kidney or Bone Disease:  Slight variation in eGFR past 6 months    Sexual Health Vaccines  - Hep A Vaccine Status:  -Hep B Vaccine Status:   -HPV Vaccine Status:     PrEP Labs  - Renal function (last labs):  - Hep C Status (last labs):  - Hep B Status (last labs):  - Last RPR:  - Last GC/CT:    Results for ROYCE CEE (MRN [id number]) as of 7/30/2020 17:56   Ref. Range 4/24/2020 12:58   RPR, quant. Latest Ref Range: NonRea<1:1  1:2 (H)     Results for ROYCE CEE (MRN [id number]) as of 7/30/2020 17:56   Ref. Range 9/6/2019 13:41 11/22/2019 12:19 4/24/2020 12:58   RPR Latest Ref Range: Non Reactive  Non Reactive Non Reactive Reactive (A)       Results for ROYCE CEE (MRN [id number]) as of 7/30/2020 17:56   Ref. Range 5/24/2016 17:04 11/11/2016 07:20 7/17/2018 15:46 5/23/2019 16:23 4/24/2020 12:58   Hep B Core Total Ab Unknown Negative       Hep B S Ab Unknown Negative  Positive (A)     Hepatitis B Surface Ag Unknown Negative Negative      Hep C Virus Ab Signal/Cutoff Latest Ref Range: 0.0 - 0.9 s/co ratio     0.2   Hepatitis C Ab Latest Ref Range: NON-REACTIVE  Negative   NON-REACTIVE        Results for ROYCE CEE (MRN [id number]) as of 7/30/2020 17:56   Ref. Range 6/20/2018 09:12 1/29/2019 11:27 5/23/2019 16:23 9/6/2019 13:41 11/22/2019  12:20 4/24/2020 12:58   Sodium Latest Ref Range: 134 - 144 mmol/L 137 138 139 142 141 141   Potassium Latest Ref Range: 3.5 - 5.2 mmol/L 3.9 4.7 4.3 4.3 4.9 4.3   Chloride Latest Ref Range: 96 - 106 mmol/L 104 102 103 101 101 102   CO2 Latest Ref Range: 20 - 29 mmol/L 26 24 24 24 23 24   Anion Gap Latest Ref Range: 8 - 16 mmol/L 7 (L)        BUN, Bld Latest Ref Range: 6 - 20 mg/dL 16 17 15 23 (H) 16 15   Creatinine Latest Ref Range: 0.76 - 1.27 mg/dL 1.3 1.18 1.05 1.37 (H) 1.28 (H) 1.28 (H)

## 2021-04-16 ENCOUNTER — PATIENT MESSAGE (OUTPATIENT)
Dept: RESEARCH | Facility: HOSPITAL | Age: 41
End: 2021-04-16

## 2021-06-23 ENCOUNTER — HISTORICAL (OUTPATIENT)
Dept: ADMINISTRATIVE | Facility: HOSPITAL | Age: 41
End: 2021-06-23

## 2021-06-23 LAB
HAV IGM SERPL QL IA: NONREACTIVE
HBV CORE IGM SERPL QL IA: NONREACTIVE
HBV SURFACE AG SERPL QL IA: NONREACTIVE
HCV AB SERPL QL IA: NONREACTIVE
HEPATITIS PANEL INTERP: NORMAL
HIV 1+2 AB+HIV1 P24 AG SERPL QL IA: NONREACTIVE
RPR SER QL: NORMAL
T PALLIDUM AB SER QL: REACTIVE

## 2021-11-11 ENCOUNTER — HISTORICAL (OUTPATIENT)
Dept: ADMINISTRATIVE | Facility: HOSPITAL | Age: 41
End: 2021-11-11

## 2021-11-11 LAB
ABS NEUT (OLG): 5.86 X10(3)/MCL (ref 2.1–9.2)
ALBUMIN SERPL-MCNC: 4.6 GM/DL (ref 3.5–5)
ALBUMIN/GLOB SERPL: 1.4 RATIO (ref 1.1–2)
ALP SERPL-CCNC: 99 UNIT/L (ref 40–150)
ALT SERPL-CCNC: 60 UNIT/L (ref 0–55)
AST SERPL-CCNC: 44 UNIT/L (ref 5–34)
BASOPHILS # BLD AUTO: 0 X10(3)/MCL (ref 0–0.2)
BASOPHILS NFR BLD AUTO: 0 %
BILIRUB SERPL-MCNC: 0.3 MG/DL
BILIRUBIN DIRECT+TOT PNL SERPL-MCNC: 0.1 MG/DL (ref 0–0.5)
BILIRUBIN DIRECT+TOT PNL SERPL-MCNC: 0.2 MG/DL (ref 0–0.8)
BUN SERPL-MCNC: 20 MG/DL (ref 8.9–20.6)
CALCIUM SERPL-MCNC: 10 MG/DL (ref 8.7–10.5)
CHLORIDE SERPL-SCNC: 104 MMOL/L (ref 98–107)
CO2 SERPL-SCNC: 24 MMOL/L (ref 22–29)
CREAT SERPL-MCNC: 1.39 MG/DL (ref 0.73–1.18)
EOSINOPHIL # BLD AUTO: 0 X10(3)/MCL (ref 0–0.9)
EOSINOPHIL NFR BLD AUTO: 0 %
ERYTHROCYTE [DISTWIDTH] IN BLOOD BY AUTOMATED COUNT: 13.4 % (ref 11.5–17)
GLOBULIN SER-MCNC: 3.4 GM/DL (ref 2.4–3.5)
GLUCOSE SERPL-MCNC: 88 MG/DL (ref 74–100)
HCT VFR BLD AUTO: 41.8 % (ref 42–52)
HGB BLD-MCNC: 14 GM/DL (ref 14–18)
LYMPHOCYTES # BLD AUTO: 2.4 X10(3)/MCL (ref 0.6–4.6)
LYMPHOCYTES NFR BLD AUTO: 26 %
MCH RBC QN AUTO: 30.4 PG (ref 27–31)
MCHC RBC AUTO-ENTMCNC: 33.5 GM/DL (ref 33–36)
MCV RBC AUTO: 90.9 FL (ref 80–94)
MONOCYTES # BLD AUTO: 0.7 X10(3)/MCL (ref 0.1–1.3)
MONOCYTES NFR BLD AUTO: 8 %
NEUTROPHILS # BLD AUTO: 5.86 X10(3)/MCL (ref 2.1–9.2)
NEUTROPHILS NFR BLD AUTO: 64 %
PLATELET # BLD AUTO: 318 X10(3)/MCL (ref 130–400)
PMV BLD AUTO: 10.2 FL (ref 9.4–12.4)
POTASSIUM SERPL-SCNC: 4.2 MMOL/L (ref 3.5–5.1)
PROT SERPL-MCNC: 8 GM/DL (ref 6.4–8.3)
RBC # BLD AUTO: 4.6 X10(6)/MCL (ref 4.7–6.1)
SODIUM SERPL-SCNC: 137 MMOL/L (ref 136–145)
WBC # SPEC AUTO: 9.1 X10(3)/MCL (ref 4.5–11.5)

## 2022-04-11 ENCOUNTER — HISTORICAL (OUTPATIENT)
Dept: ADMINISTRATIVE | Facility: HOSPITAL | Age: 42
End: 2022-04-11
Payer: COMMERCIAL

## 2022-04-28 VITALS
BODY MASS INDEX: 31.74 KG/M2 | OXYGEN SATURATION: 99 % | DIASTOLIC BLOOD PRESSURE: 75 MMHG | HEIGHT: 67 IN | WEIGHT: 202.25 LBS | SYSTOLIC BLOOD PRESSURE: 149 MMHG

## 2022-06-27 ENCOUNTER — TELEPHONE (OUTPATIENT)
Dept: PRIMARY CARE CLINIC | Facility: CLINIC | Age: 42
End: 2022-06-27
Payer: COMMERCIAL

## 2022-06-27 ENCOUNTER — OFFICE VISIT (OUTPATIENT)
Dept: PRIMARY CARE CLINIC | Facility: CLINIC | Age: 42
End: 2022-06-27
Payer: COMMERCIAL

## 2022-06-27 VITALS
HEART RATE: 86 BPM | OXYGEN SATURATION: 98 % | BODY MASS INDEX: 32.1 KG/M2 | DIASTOLIC BLOOD PRESSURE: 80 MMHG | SYSTOLIC BLOOD PRESSURE: 132 MMHG | WEIGHT: 204.5 LBS

## 2022-06-27 DIAGNOSIS — D64.9 ANEMIA, UNSPECIFIED TYPE: ICD-10-CM

## 2022-06-27 DIAGNOSIS — Z20.6 CONTACT WITH AND (SUSPECTED) EXPOSURE TO HUMAN IMMUNODEFICIENCY VIRUS (HIV): ICD-10-CM

## 2022-06-27 DIAGNOSIS — M77.11 RIGHT TENNIS ELBOW: ICD-10-CM

## 2022-06-27 DIAGNOSIS — Z11.3 ROUTINE SCREENING FOR STI (SEXUALLY TRANSMITTED INFECTION): ICD-10-CM

## 2022-06-27 DIAGNOSIS — U07.1 COVID-19: ICD-10-CM

## 2022-06-27 DIAGNOSIS — Z00.00 WELLNESS EXAMINATION: Primary | ICD-10-CM

## 2022-06-27 PROCEDURE — 99395 PR PREVENTIVE VISIT,EST,18-39: ICD-10-PCS | Mod: ,,, | Performed by: FAMILY MEDICINE

## 2022-06-27 PROCEDURE — 1160F RVW MEDS BY RX/DR IN RCRD: CPT | Mod: CPTII,,, | Performed by: FAMILY MEDICINE

## 2022-06-27 PROCEDURE — 1159F MED LIST DOCD IN RCRD: CPT | Mod: CPTII,,, | Performed by: FAMILY MEDICINE

## 2022-06-27 PROCEDURE — 1160F PR REVIEW ALL MEDS BY PRESCRIBER/CLIN PHARMACIST DOCUMENTED: ICD-10-PCS | Mod: CPTII,,, | Performed by: FAMILY MEDICINE

## 2022-06-27 PROCEDURE — 3079F PR MOST RECENT DIASTOLIC BLOOD PRESSURE 80-89 MM HG: ICD-10-PCS | Mod: CPTII,,, | Performed by: FAMILY MEDICINE

## 2022-06-27 PROCEDURE — 1159F PR MEDICATION LIST DOCUMENTED IN MEDICAL RECORD: ICD-10-PCS | Mod: CPTII,,, | Performed by: FAMILY MEDICINE

## 2022-06-27 PROCEDURE — 99395 PREV VISIT EST AGE 18-39: CPT | Mod: ,,, | Performed by: FAMILY MEDICINE

## 2022-06-27 PROCEDURE — 3079F DIAST BP 80-89 MM HG: CPT | Mod: CPTII,,, | Performed by: FAMILY MEDICINE

## 2022-06-27 PROCEDURE — 3075F SYST BP GE 130 - 139MM HG: CPT | Mod: CPTII,,, | Performed by: FAMILY MEDICINE

## 2022-06-27 PROCEDURE — 3008F PR BODY MASS INDEX (BMI) DOCUMENTED: ICD-10-PCS | Mod: CPTII,,, | Performed by: FAMILY MEDICINE

## 2022-06-27 PROCEDURE — 3075F PR MOST RECENT SYSTOLIC BLOOD PRESS GE 130-139MM HG: ICD-10-PCS | Mod: CPTII,,, | Performed by: FAMILY MEDICINE

## 2022-06-27 PROCEDURE — 3008F BODY MASS INDEX DOCD: CPT | Mod: CPTII,,, | Performed by: FAMILY MEDICINE

## 2022-06-27 RX ORDER — CETIRIZINE HYDROCHLORIDE 10 MG/1
10 TABLET ORAL DAILY
COMMUNITY

## 2022-06-27 RX ORDER — SODIUM PICOSULFATE, MAGNESIUM OXIDE, AND ANHYDROUS CITRIC ACID 10; 3.5; 12 MG/160ML; G/160ML; G/160ML
LIQUID ORAL
COMMUNITY
Start: 2022-01-06

## 2022-06-27 RX ORDER — FLUTICASONE PROPIONATE 50 MCG
1 SPRAY, SUSPENSION (ML) NASAL DAILY
COMMUNITY

## 2022-06-27 RX ORDER — SYRING-NEEDL,DISP,INSUL,0.3 ML 29 G X1/2"
SYRINGE, EMPTY DISPOSABLE MISCELLANEOUS
COMMUNITY
Start: 2022-01-06

## 2022-06-27 NOTE — ASSESSMENT & PLAN NOTE
Stressed the importance of follow-up for quarterly monitoring of STI panel, hiv, and [id number] LFTs with Truvada therapy and continue to practice safe sex practices with HIV positive partner.  We will also check syphilis screening with syphilis antibody with reflex to RPR due to patient's prior history.  I discussed with the patient that antibodies will likely be positive because of prior history of syphilis however reflex testing will tell us whether not there is an active infection.

## 2022-06-28 PROBLEM — M77.11 RIGHT TENNIS ELBOW: Status: ACTIVE | Noted: 2022-06-28

## 2022-06-28 NOTE — ASSESSMENT & PLAN NOTE
Instructions given for home stretching with cool compresses and consider physical therapy if symptoms persist

## 2022-06-28 NOTE — PROGRESS NOTES
Chief Complaint  Chief Complaint   Patient presents with    Follow-up     without labs        History of Present Illness  He presents to clinic for routine annual wellness visit.  He was intended to have lab work performed prior to this visit monitoring of LFTs and routine HIV test per patient on Truvada and a relationship with his  who has been HIV positive since 2013. He does admit that he has has been are not sexually active at this time and when they happen in the past have been very careful with barrier protection even when on Truvada for pre exposure prophylaxis.  Patient also has a known prior history of syphilis treated with Bicillin in 2016 and again in 03/2020 after a positive RPR with reflex titer at 1:2 with doxycycline.  He denies any other known exposures to syphilis since that time we would still like to be checked intermittently.  His anxiety still seems adequately controlled with citalopram continues to take daily prophylactic valacyclovir to 2 HSV 1 history.  His only other complaints today are intermittent aching pain with dynamic exercises of his right lateral elbow intermittent sciatica pain which is a chronic issue which does not seem progressive.  Patient also tested positive for COVID-19 8 days ago after developing symptoms 2 days prior and thus is currently on day 9 following day 1 of initial concern for contagiousness.  He isolated for the 1st 5 days and he himself was vaccinated and has not had any significant symptoms since day 5.    PMH:  PrEP-  has been HIV positive since 2013, has been on HAART therapy reportedly with nondetectable levels, compliant with daily Truvada with recommendation for 3 month followups for HIV testing and biannual LFT levels  History of syphilis-treated with Bicillin 2016, treated again/03/2020 after positive RPR with reflex titer at 1:2 doxycycline  GA D-citalopram 40 mg daily  HSV 1-daily prophylactic treatment with Valtrex 500 mg  Allergic  rhinitis-cetirizine    Review of Systems  Review of Systems   Constitutional: Negative for fatigue and fever.   HENT: Negative for congestion and sore throat.    Eyes: Negative for redness and visual disturbance.   Respiratory: Negative for cough and shortness of breath.    Cardiovascular: Negative for chest pain and palpitations.   Gastrointestinal: Negative for nausea and vomiting.   Musculoskeletal: Positive for arthralgias and back pain. Negative for myalgias.   Neurological: Negative for dizziness and headaches.   Psychiatric/Behavioral: Negative for agitation and confusion.       Physical Exam  Physical Exam  Constitutional:       Appearance: Normal appearance.   HENT:      Head: Normocephalic and atraumatic.   Eyes:      General: No scleral icterus.     Conjunctiva/sclera: Conjunctivae normal.   Cardiovascular:      Rate and Rhythm: Normal rate and regular rhythm.   Pulmonary:      Effort: Pulmonary effort is normal.      Breath sounds: Normal breath sounds.   Abdominal:      Palpations: Abdomen is soft.      Tenderness: There is no abdominal tenderness.   Musculoskeletal:         General: No swelling or deformity.      Comments:  Tenderness noted with external rotation to the right lateral epicondyle   Skin:     General: Skin is warm and dry.   Neurological:      General: No focal deficit present.      Mental Status: He is alert and oriented to person, place, and time.   Psychiatric:         Mood and Affect: Mood normal.         Behavior: Behavior normal.         Problem List/Past Medical History  Past Medical History:   Diagnosis Date    On pre-exposure prophylaxis for HIV        Procedure/Surgical History  Past Surgical History:   Procedure Laterality Date    EYE SURGERY         Medications  Current Outpatient Medications on File Prior to Visit   Medication Sig Dispense Refill    cetirizine (ZYRTEC) 10 MG tablet Take 10 mg by mouth once daily.      citalopram (CELEXA) 20 MG tablet Take 1 tablet (20 mg  total) by mouth once daily. 90 tablet 1    emtricitabine-tenofovir 200-300 mg (TRUVADA) 200-300 mg Tab Take 1 tablet by mouth once daily. 30 tablet 2    fluticasone propionate (FLONASE) 50 mcg/actuation nasal spray 1 spray by Each Nostril route once daily.      valACYclovir (VALTREX) 500 MG tablet Take 1 tablet (500 mg total) by mouth once daily. 90 tablet 1     No current facility-administered medications on file prior to visit.       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 No pertinent family history.      Immunizations  Immunization History   Administered Date(s) Administered    HPV 9-Valent 06/03/2016, 08/24/2016, 06/22/2017    Hepatitis A / Hepatitis B 12/17/2003    Hepatitis B, Adult 06/03/2016, 07/07/2016, 06/22/2017, 08/01/2017, 12/15/2017       Health Maintenance  Health Maintenance   Topic Date Due    TETANUS VACCINE  Never done    Lipid Panel  10/07/2025    Hepatitis C Screening  Completed        Assessment / Plan  Problem List Items Addressed This Visit        ID    HIV Pre-Exposure Prohylaxis (PrEP)    Current Assessment & Plan     Stressed the importance of follow-up for quarterly monitoring of STI panel, hiv, and x0sffcn LFTs with Truvada therapy and continue to practice safe sex practices with HIV positive partner.  We will also check syphilis screening with syphilis antibody with reflex to RPR due to  patient's prior history.  I discussed with the patient that antibodies will likely be positive because of prior history of syphilis however reflex testing will tell us whether not there is an active infection.           COVID-19    Current Assessment & Plan     Continue mask wearing until past day 10 from symptom onset (currently day 9)              Orthopedic    Right tennis elbow    Current Assessment & Plan     Instructions given for home stretching with cool compresses and consider physical therapy if symptoms persist              Other    Wellness examination - Primary    Current Assessment & Plan     Discussed routine annual health maintenance and screening in addition to acute issues noted below.             Other Visit Diagnoses     Routine screening for STI (sexually transmitted infection)        Anemia, unspecified type              RTC 12 months for wellness with 3 month follow-up for telephonic visit to address lab values    Art Ford

## 2022-07-07 ENCOUNTER — PATIENT MESSAGE (OUTPATIENT)
Dept: PRIMARY CARE CLINIC | Facility: CLINIC | Age: 42
End: 2022-07-07
Payer: COMMERCIAL

## 2022-07-08 DIAGNOSIS — A60.00 RECURRENT GENITAL HERPES: ICD-10-CM

## 2022-07-08 RX ORDER — VALACYCLOVIR HYDROCHLORIDE 500 MG/1
500 TABLET, FILM COATED ORAL DAILY
Qty: 90 TABLET | Refills: 3 | Status: SHIPPED | OUTPATIENT
Start: 2022-07-08

## 2022-09-26 ENCOUNTER — PATIENT MESSAGE (OUTPATIENT)
Dept: PRIMARY CARE CLINIC | Facility: CLINIC | Age: 42
End: 2022-09-26
Payer: COMMERCIAL

## 2022-09-26 PROBLEM — Z00.00 WELLNESS EXAMINATION: Status: RESOLVED | Noted: 2022-06-27 | Resolved: 2022-09-26

## 2023-06-06 ENCOUNTER — PATIENT MESSAGE (OUTPATIENT)
Dept: PRIMARY CARE CLINIC | Facility: CLINIC | Age: 43
End: 2023-06-06
Payer: COMMERCIAL

## 2023-06-06 DIAGNOSIS — Z20.6 CONTACT WITH AND (SUSPECTED) EXPOSURE TO HUMAN IMMUNODEFICIENCY VIRUS (HIV): Primary | ICD-10-CM

## 2023-06-06 DIAGNOSIS — Z11.3 ROUTINE SCREENING FOR STI (SEXUALLY TRANSMITTED INFECTION): ICD-10-CM

## 2023-06-07 ENCOUNTER — DOCUMENTATION ONLY (OUTPATIENT)
Dept: PRIMARY CARE CLINIC | Facility: CLINIC | Age: 43
End: 2023-06-07
Payer: COMMERCIAL

## 2023-06-07 DIAGNOSIS — Z11.3 ROUTINE SCREENING FOR STI (SEXUALLY TRANSMITTED INFECTION): Primary | ICD-10-CM

## 2023-06-08 DIAGNOSIS — A60.00 RECURRENT GENITAL HERPES: Primary | ICD-10-CM

## 2023-06-08 DIAGNOSIS — Z91.89 AT RISK FOR SEXUALLY TRANSMITTED DISEASE DUE TO UNPROTECTED SEX: ICD-10-CM

## 2023-06-08 DIAGNOSIS — Z20.6 CONTACT WITH AND (SUSPECTED) EXPOSURE TO HUMAN IMMUNODEFICIENCY VIRUS (HIV): ICD-10-CM

## 2023-06-14 ENCOUNTER — DOCUMENTATION ONLY (OUTPATIENT)
Dept: PRIMARY CARE CLINIC | Facility: CLINIC | Age: 43
End: 2023-06-14

## 2023-06-14 PROBLEM — U07.1 COVID-19: Status: RESOLVED | Noted: 2022-06-27 | Resolved: 2023-06-14

## 2023-06-14 PROBLEM — E78.41 ELEVATED LIPOPROTEIN(A): Status: ACTIVE | Noted: 2023-06-14

## 2023-09-18 PROBLEM — Z00.00 WELLNESS EXAMINATION: Status: RESOLVED | Noted: 2022-06-27 | Resolved: 2023-09-18
